# Patient Record
Sex: FEMALE | Race: WHITE | NOT HISPANIC OR LATINO
[De-identification: names, ages, dates, MRNs, and addresses within clinical notes are randomized per-mention and may not be internally consistent; named-entity substitution may affect disease eponyms.]

---

## 2017-10-04 ENCOUNTER — RESULT REVIEW (OUTPATIENT)
Age: 32
End: 2017-10-04

## 2017-11-22 ENCOUNTER — RESULT REVIEW (OUTPATIENT)
Age: 32
End: 2017-11-22

## 2018-01-11 ENCOUNTER — OUTPATIENT (OUTPATIENT)
Dept: OUTPATIENT SERVICES | Facility: HOSPITAL | Age: 33
LOS: 1 days | End: 2018-01-11
Payer: COMMERCIAL

## 2018-01-11 ENCOUNTER — APPOINTMENT (OUTPATIENT)
Dept: RADIOLOGY | Facility: CLINIC | Age: 33
End: 2018-01-11
Payer: COMMERCIAL

## 2018-01-11 DIAGNOSIS — Z00.8 ENCOUNTER FOR OTHER GENERAL EXAMINATION: ICD-10-CM

## 2018-01-11 PROCEDURE — 71046 X-RAY EXAM CHEST 2 VIEWS: CPT | Mod: 26

## 2018-01-11 PROCEDURE — 71046 X-RAY EXAM CHEST 2 VIEWS: CPT

## 2018-01-17 ENCOUNTER — OUTPATIENT (OUTPATIENT)
Dept: OUTPATIENT SERVICES | Facility: HOSPITAL | Age: 33
LOS: 1 days | End: 2018-01-17
Payer: COMMERCIAL

## 2018-01-17 ENCOUNTER — APPOINTMENT (OUTPATIENT)
Dept: ULTRASOUND IMAGING | Facility: CLINIC | Age: 33
End: 2018-01-17
Payer: COMMERCIAL

## 2018-01-17 DIAGNOSIS — Z00.8 ENCOUNTER FOR OTHER GENERAL EXAMINATION: ICD-10-CM

## 2018-01-17 PROCEDURE — 76775 US EXAM ABDO BACK WALL LIM: CPT

## 2018-01-17 PROCEDURE — 76775 US EXAM ABDO BACK WALL LIM: CPT | Mod: 26

## 2018-05-09 ENCOUNTER — TRANSCRIPTION ENCOUNTER (OUTPATIENT)
Age: 33
End: 2018-05-09

## 2018-05-29 ENCOUNTER — APPOINTMENT (OUTPATIENT)
Dept: ORTHOPEDIC SURGERY | Facility: CLINIC | Age: 33
End: 2018-05-29
Payer: COMMERCIAL

## 2018-05-29 VITALS — HEIGHT: 69 IN | BODY MASS INDEX: 29.62 KG/M2 | WEIGHT: 200 LBS

## 2018-05-29 PROCEDURE — 99203 OFFICE O/P NEW LOW 30 MIN: CPT

## 2018-05-29 PROCEDURE — 73030 X-RAY EXAM OF SHOULDER: CPT | Mod: LT

## 2018-10-04 ENCOUNTER — RESULT REVIEW (OUTPATIENT)
Age: 33
End: 2018-10-04

## 2020-04-25 ENCOUNTER — MESSAGE (OUTPATIENT)
Age: 35
End: 2020-04-25

## 2020-05-04 ENCOUNTER — APPOINTMENT (OUTPATIENT)
Dept: DISASTER EMERGENCY | Facility: HOSPITAL | Age: 35
End: 2020-05-04

## 2020-05-05 LAB
SARS-COV-2 IGG SERPL IA-ACNC: <0.1 INDEX
SARS-COV-2 IGG SERPL QL IA: NEGATIVE

## 2020-05-18 ENCOUNTER — APPOINTMENT (OUTPATIENT)
Dept: DISASTER EMERGENCY | Facility: CLINIC | Age: 35
End: 2020-05-18

## 2020-07-06 ENCOUNTER — OUTPATIENT (OUTPATIENT)
Dept: OUTPATIENT SERVICES | Facility: HOSPITAL | Age: 35
LOS: 1 days | End: 2020-07-06

## 2020-07-06 ENCOUNTER — TRANSCRIPTION ENCOUNTER (OUTPATIENT)
Age: 35
End: 2020-07-06

## 2020-07-06 ENCOUNTER — APPOINTMENT (OUTPATIENT)
Dept: PRIMARY CARE | Facility: HOSPITAL | Age: 35
End: 2020-07-06

## 2020-07-06 VITALS
TEMPERATURE: 97.8 F | HEIGHT: 69 IN | BODY MASS INDEX: 24.73 KG/M2 | DIASTOLIC BLOOD PRESSURE: 88 MMHG | HEART RATE: 89 BPM | SYSTOLIC BLOOD PRESSURE: 163 MMHG | WEIGHT: 167 LBS

## 2020-07-06 LAB
BILIRUB UR QL STRIP: NEGATIVE
GLUCOSE UR-MCNC: NEGATIVE
HCG UR QL: 0.2 EU/DL
HGB UR QL STRIP.AUTO: NORMAL
KETONES UR-MCNC: NEGATIVE
LEUKOCYTE ESTERASE UR QL STRIP: NEGATIVE
NITRITE UR QL STRIP: NEGATIVE
PH UR STRIP: 5.5
PROT UR STRIP-MCNC: NEGATIVE
SP GR UR STRIP: 1

## 2020-07-06 NOTE — REVIEW OF SYSTEMS
[Negative] : Heme/Lymph [FreeTextEntry4] : Positive neck pain and neck spasms  [FreeTextEntry9] : Positive for Back pain
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
## 2020-07-06 NOTE — HEALTH RISK ASSESSMENT
[No] : No [No falls in past year] : Patient reported no falls in the past year [0] : 2) Feeling down, depressed, or hopeless: Not at all (0) [de-identified] : regular diet  [de-identified] : normal actiivity

## 2020-07-06 NOTE — PHYSICAL EXAM
[No Acute Distress] : no acute distress [Well Nourished] : well nourished [Well-Appearing] : well-appearing [Well Developed] : well developed [Normal Outer Ear/Nose] : the outer ears and nose were normal in appearance [Normal Sclera/Conjunctiva] : normal sclera/conjunctiva [PERRL] : pupils equal round and reactive to light [EOMI] : extraocular movements intact [No Respiratory Distress] : no respiratory distress  [No Carotid Bruits] : no carotid bruits [Regular Rhythm] : with a regular rhythm [No Accessory Muscle Use] : no accessory muscle use [Normal Rate] : normal rate  [Soft] : abdomen soft [Non Tender] : non-tender [No Abdominal Bruit] : a ~M bruit was not heard ~T in the abdomen [No Rash] : no rash [No Spinal Tenderness] : no spinal tenderness [No CVA Tenderness] : no CVA  tenderness [No Focal Deficits] : no focal deficits [Coordination Grossly Intact] : coordination grossly intact [Normal Gait] : normal gait [de-identified] : Negative Straight Leg Test  [de-identified] : + Neck Pain/ spasm -> tender on Cervical region C4-C5, slight pain upon palpation, no mass or visible injury, able to flex and hyperextend the neck, able to move head 90 degrees to the left but only 45 degrees to the left

## 2020-07-07 DIAGNOSIS — M62.838 OTHER MUSCLE SPASM: ICD-10-CM

## 2020-07-07 DIAGNOSIS — K21.9 GASTRO-ESOPHAGEAL REFLUX DISEASE WITHOUT ESOPHAGITIS: ICD-10-CM

## 2020-07-07 DIAGNOSIS — R35.0 FREQUENCY OF MICTURITION: ICD-10-CM

## 2020-07-07 DIAGNOSIS — I10 ESSENTIAL (PRIMARY) HYPERTENSION: ICD-10-CM

## 2020-10-13 ENCOUNTER — TRANSCRIPTION ENCOUNTER (OUTPATIENT)
Age: 35
End: 2020-10-13

## 2020-10-27 ENCOUNTER — APPOINTMENT (OUTPATIENT)
Dept: ORTHOPEDIC SURGERY | Facility: CLINIC | Age: 35
End: 2020-10-27
Payer: OTHER MISCELLANEOUS

## 2020-10-27 VITALS
HEIGHT: 69 IN | SYSTOLIC BLOOD PRESSURE: 132 MMHG | BODY MASS INDEX: 27.4 KG/M2 | DIASTOLIC BLOOD PRESSURE: 87 MMHG | HEART RATE: 90 BPM | WEIGHT: 185 LBS

## 2020-10-27 DIAGNOSIS — M41.9 SCOLIOSIS, UNSPECIFIED: ICD-10-CM

## 2020-10-27 PROCEDURE — 72100 X-RAY EXAM L-S SPINE 2/3 VWS: CPT

## 2020-10-27 PROCEDURE — 99072 ADDL SUPL MATRL&STAF TM PHE: CPT

## 2020-10-27 PROCEDURE — 99204 OFFICE O/P NEW MOD 45 MIN: CPT

## 2020-11-02 ENCOUNTER — NON-APPOINTMENT (OUTPATIENT)
Age: 35
End: 2020-11-02

## 2020-12-17 ENCOUNTER — FORM ENCOUNTER (OUTPATIENT)
Age: 35
End: 2020-12-17

## 2021-02-24 ENCOUNTER — RESULT REVIEW (OUTPATIENT)
Age: 36
End: 2021-02-24

## 2021-03-02 ENCOUNTER — APPOINTMENT (OUTPATIENT)
Dept: ULTRASOUND IMAGING | Facility: IMAGING CENTER | Age: 36
End: 2021-03-02
Payer: COMMERCIAL

## 2021-03-02 ENCOUNTER — APPOINTMENT (OUTPATIENT)
Dept: MAMMOGRAPHY | Facility: IMAGING CENTER | Age: 36
End: 2021-03-02
Payer: COMMERCIAL

## 2021-03-02 ENCOUNTER — OUTPATIENT (OUTPATIENT)
Dept: OUTPATIENT SERVICES | Facility: HOSPITAL | Age: 36
LOS: 1 days | End: 2021-03-02
Payer: COMMERCIAL

## 2021-03-02 DIAGNOSIS — Z00.8 ENCOUNTER FOR OTHER GENERAL EXAMINATION: ICD-10-CM

## 2021-03-02 PROCEDURE — 77067 SCR MAMMO BI INCL CAD: CPT

## 2021-03-02 PROCEDURE — 76641 ULTRASOUND BREAST COMPLETE: CPT | Mod: 26,50

## 2021-03-02 PROCEDURE — 77063 BREAST TOMOSYNTHESIS BI: CPT | Mod: 26

## 2021-03-02 PROCEDURE — 76641 ULTRASOUND BREAST COMPLETE: CPT

## 2021-03-02 PROCEDURE — 77067 SCR MAMMO BI INCL CAD: CPT | Mod: 26

## 2021-03-02 PROCEDURE — 77063 BREAST TOMOSYNTHESIS BI: CPT

## 2021-03-09 ENCOUNTER — NON-APPOINTMENT (OUTPATIENT)
Age: 36
End: 2021-03-09

## 2021-03-16 ENCOUNTER — NON-APPOINTMENT (OUTPATIENT)
Age: 36
End: 2021-03-16

## 2021-03-16 ENCOUNTER — APPOINTMENT (OUTPATIENT)
Dept: INTERNAL MEDICINE | Facility: CLINIC | Age: 36
End: 2021-03-16
Payer: COMMERCIAL

## 2021-03-16 VITALS — DIASTOLIC BLOOD PRESSURE: 80 MMHG | SYSTOLIC BLOOD PRESSURE: 118 MMHG

## 2021-03-16 VITALS
WEIGHT: 186 LBS | HEART RATE: 79 BPM | TEMPERATURE: 97.7 F | SYSTOLIC BLOOD PRESSURE: 120 MMHG | BODY MASS INDEX: 27.55 KG/M2 | OXYGEN SATURATION: 99 % | HEIGHT: 69 IN | DIASTOLIC BLOOD PRESSURE: 82 MMHG

## 2021-03-16 DIAGNOSIS — Z87.898 PERSONAL HISTORY OF OTHER SPECIFIED CONDITIONS: ICD-10-CM

## 2021-03-16 DIAGNOSIS — Z80.49 FAMILY HISTORY OF MALIGNANT NEOPLASM OF OTHER GENITAL ORGANS: ICD-10-CM

## 2021-03-16 DIAGNOSIS — G47.00 INSOMNIA, UNSPECIFIED: ICD-10-CM

## 2021-03-16 DIAGNOSIS — Z82.0 FAMILY HISTORY OF EPILEPSY AND OTHER DISEASES OF THE NERVOUS SYSTEM: ICD-10-CM

## 2021-03-16 DIAGNOSIS — Z83.438 FAMILY HISTORY OF OTHER DISORDER OF LIPOPROTEIN METABOLISM AND OTHER LIPIDEMIA: ICD-10-CM

## 2021-03-16 DIAGNOSIS — Z86.010 PERSONAL HISTORY OF COLONIC POLYPS: ICD-10-CM

## 2021-03-16 DIAGNOSIS — Z80.41 FAMILY HISTORY OF MALIGNANT NEOPLASM OF OVARY: ICD-10-CM

## 2021-03-16 DIAGNOSIS — M25.512 PAIN IN LEFT SHOULDER: ICD-10-CM

## 2021-03-16 DIAGNOSIS — G51.0 BELL'S PALSY: ICD-10-CM

## 2021-03-16 PROCEDURE — 99072 ADDL SUPL MATRL&STAF TM PHE: CPT

## 2021-03-16 PROCEDURE — 93000 ELECTROCARDIOGRAM COMPLETE: CPT | Mod: 59

## 2021-03-16 PROCEDURE — 99385 PREV VISIT NEW AGE 18-39: CPT | Mod: 25

## 2021-03-16 PROCEDURE — 36415 COLL VENOUS BLD VENIPUNCTURE: CPT

## 2021-03-16 PROCEDURE — G0444 DEPRESSION SCREEN ANNUAL: CPT | Mod: NC,59

## 2021-03-16 RX ORDER — IBUPROFEN 600 MG/1
600 TABLET, FILM COATED ORAL 3 TIMES DAILY
Qty: 90 | Refills: 1 | Status: COMPLETED | COMMUNITY
Start: 2020-07-06 | End: 2021-03-16

## 2021-03-16 RX ORDER — IBUPROFEN 600 MG/1
600 TABLET, FILM COATED ORAL 3 TIMES DAILY
Qty: 90 | Refills: 1 | Status: COMPLETED | COMMUNITY
Start: 2020-10-27 | End: 2021-03-16

## 2021-03-16 RX ORDER — CYCLOBENZAPRINE HYDROCHLORIDE 10 MG/1
10 TABLET, FILM COATED ORAL 3 TIMES DAILY
Qty: 90 | Refills: 1 | Status: COMPLETED | COMMUNITY
Start: 2020-07-06 | End: 2021-03-16

## 2021-03-16 NOTE — PHYSICAL EXAM
[No Acute Distress] : no acute distress [Well Nourished] : well nourished [Well Developed] : well developed [Well-Appearing] : well-appearing [Normal Voice/Communication] : normal voice/communication [Normal Sclera/Conjunctiva] : normal sclera/conjunctiva [PERRL] : pupils equal round and reactive to light [EOMI] : extraocular movements intact [Normal Outer Ear/Nose] : the outer ears and nose were normal in appearance [Normal Oropharynx] : the oropharynx was normal [Normal TMs] : both tympanic membranes were normal [No JVD] : no jugular venous distention [No Lymphadenopathy] : no lymphadenopathy [Supple] : supple [Thyroid Normal, No Nodules] : the thyroid was normal and there were no nodules present [No Respiratory Distress] : no respiratory distress  [No Accessory Muscle Use] : no accessory muscle use [Clear to Auscultation] : lungs were clear to auscultation bilaterally [Normal Rate] : normal rate  [Regular Rhythm] : with a regular rhythm [Normal S1, S2] : normal S1 and S2 [No Murmur] : no murmur heard [No Carotid Bruits] : no carotid bruits [No Abdominal Bruit] : a ~M bruit was not heard ~T in the abdomen [No Varicosities] : no varicosities [Pedal Pulses Present] : the pedal pulses are present [No Edema] : there was no peripheral edema [No Palpable Aorta] : no palpable aorta [No Extremity Clubbing/Cyanosis] : no extremity clubbing/cyanosis [Normal Appearance] : normal in appearance [No Nipple Discharge] : no nipple discharge [No Axillary Lymphadenopathy] : no axillary lymphadenopathy [Soft] : abdomen soft [Non Tender] : non-tender [Non-distended] : non-distended [No Masses] : no abdominal mass palpated [No HSM] : no HSM [Normal Bowel Sounds] : normal bowel sounds [Normal Posterior Cervical Nodes] : no posterior cervical lymphadenopathy [Normal Anterior Cervical Nodes] : no anterior cervical lymphadenopathy [No CVA Tenderness] : no CVA  tenderness [No Spinal Tenderness] : no spinal tenderness [No Joint Swelling] : no joint swelling [Grossly Normal Strength/Tone] : grossly normal strength/tone [No Rash] : no rash [Coordination Grossly Intact] : coordination grossly intact [No Focal Deficits] : no focal deficits [Normal Gait] : normal gait [Deep Tendon Reflexes (DTR)] : deep tendon reflexes were 2+ and symmetric [Normal Affect] : the affect was normal [Normal Insight/Judgement] : insight and judgment were intact [Scoliosis] : scoliosis

## 2021-03-16 NOTE — HEALTH RISK ASSESSMENT
[Excellent] : ~his/her~  mood as  excellent [Patient reported PAP Smear was normal] : Patient reported PAP Smear was normal [Patient reported mammogram was abnormal] : Patient reported mammogram was abnormal [No] : No [One fall no injury in past year] : Patient reported one fall in the past year without injury [0] : 2) Feeling down, depressed, or hopeless: Not at all (0) [HIV Test offered] : HIV Test offered [Hepatitis C test offered] : Hepatitis C test offered [Alone] : lives alone [On disability] : on disability [Single] : single [Feels Safe at Home] : Feels safe at home [Fully functional (bathing, dressing, toileting, transferring, walking, feeding)] : Fully functional (bathing, dressing, toileting, transferring, walking, feeding) [Fully functional (using the telephone, shopping, preparing meals, housekeeping, doing laundry, using] : Fully functional and needs no help or supervision to perform IADLs (using the telephone, shopping, preparing meals, housekeeping, doing laundry, using transportation, managing medications and managing finances) [Smoke Detector] : smoke detector [Carbon Monoxide Detector] : carbon monoxide detector [Seat Belt] :  uses seat belt [Sunscreen] : uses sunscreen [With Patient/Caregiver] : With Patient/Caregiver [Name: ___] : Health Care Proxy's Name: [unfilled]  [Relationship: ___] : Relationship: [unfilled] [de-identified] : Limited by back pain / herniated disc. [de-identified] : One Mechanical fall - not sure if it was a year ago or more. [Change in mental status noted] : No change in mental status noted [Language] : denies difficulty with language [Behavior] : denies difficulty with behavior [Handling Complex Tasks] : denies difficulty handling complex tasks [Reasoning] : denies difficulty with reasoning [Sexually Active] : not sexually active [Reports changes in hearing] : Reports no changes in hearing [Reports changes in vision] : Reports no changes in vision [Reports changes in dental health] : Reports no changes in dental health [MammogramDate] : 3/21 [PapSmearDate] : 2/21 [PapSmearComments] : Dr. Carlos Camara [de-identified] : January 2021. [de-identified] : Seen January 2021. [AdvancecareDate] : 03/16/2021

## 2021-03-16 NOTE — HISTORY OF PRESENT ILLNESS
[de-identified] : New Patient - 1st visit to [facility] [place].\par Recent PCP - Kodak Nguyen.\par \par 35 year old female with a history of PCOS, GERD, and herniated disc.  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  She had herniation of L4-L5 with nerve compression.  She went on medical leave and is on workers compensation.  She sees Dr. Justin Hooks (Neuro).  She had an epidural which did not help her symptoms.  She had been on ibuprofen, but couldn't tolerate it due to her GERD.  She is currently on prn cyclobenzaprine, Tylenol, gabapentin.\par \[facility] Patient will be seeing Dr. Bert Carballo this week for ongoing management of GERD and her history of colon polyps.  She had EGD in 2017 showing gastritis. She had a colonoscopy at that time, and there was polyp removed.   She previously saw Dr. Eulalio Kirby.  Five year follow-up was recommended.\par \par Patient had a normal thyroid US about 2014.  Her PCP at the time felt that the thyroid was enlarged.  She went to an Endocrinologist at the time as well and reports that there were no significant findings.  Recently, her Gyn felt it was enlarged as well.  Patient does not have symptoms of an overactive or underactive thyroid.\par \[facility] Patient had her mammogram and breast US 3/2/2021.  She had the COVID-19 vaccine about 2 hours before, but there were swollen LNs which will be followed in 3 months.  There was a lobulated circumscribed mass in the lower outer R breast on the mammogram corresponding to a cluster of microcysts on the US at 8:00.  Six month follow-up targeted US was recommended for this finding.  BIRADS-3.

## 2021-03-22 ENCOUNTER — OUTPATIENT (OUTPATIENT)
Dept: OUTPATIENT SERVICES | Facility: HOSPITAL | Age: 36
LOS: 1 days | End: 2021-03-22
Payer: COMMERCIAL

## 2021-03-22 ENCOUNTER — APPOINTMENT (OUTPATIENT)
Dept: ULTRASOUND IMAGING | Facility: CLINIC | Age: 36
End: 2021-03-22
Payer: COMMERCIAL

## 2021-03-22 DIAGNOSIS — Z00.00 ENCOUNTER FOR GENERAL ADULT MEDICAL EXAMINATION WITHOUT ABNORMAL FINDINGS: ICD-10-CM

## 2021-03-22 PROCEDURE — 76700 US EXAM ABDOM COMPLETE: CPT | Mod: 26

## 2021-03-22 PROCEDURE — 76700 US EXAM ABDOM COMPLETE: CPT

## 2021-05-26 ENCOUNTER — RESULT REVIEW (OUTPATIENT)
Age: 36
End: 2021-05-26

## 2021-06-04 ENCOUNTER — APPOINTMENT (OUTPATIENT)
Dept: ULTRASOUND IMAGING | Facility: IMAGING CENTER | Age: 36
End: 2021-06-04
Payer: COMMERCIAL

## 2021-06-04 ENCOUNTER — OUTPATIENT (OUTPATIENT)
Dept: OUTPATIENT SERVICES | Facility: HOSPITAL | Age: 36
LOS: 1 days | End: 2021-06-04
Payer: COMMERCIAL

## 2021-06-04 DIAGNOSIS — Z00.8 ENCOUNTER FOR OTHER GENERAL EXAMINATION: ICD-10-CM

## 2021-06-04 DIAGNOSIS — R59.0 LOCALIZED ENLARGED LYMPH NODES: ICD-10-CM

## 2021-06-04 PROCEDURE — 76882 US LMTD JT/FCL EVL NVASC XTR: CPT

## 2021-06-04 PROCEDURE — 76882 US LMTD JT/FCL EVL NVASC XTR: CPT | Mod: 26,RT

## 2021-06-30 ENCOUNTER — RESULT REVIEW (OUTPATIENT)
Age: 36
End: 2021-06-30

## 2021-07-10 LAB
25(OH)D3 SERPL-MCNC: 27.6 NG/ML
ALBUMIN SERPL ELPH-MCNC: 4.9 G/DL
ALP BLD-CCNC: 57 U/L
ALT SERPL-CCNC: 9 U/L
ANION GAP SERPL CALC-SCNC: 12 MMOL/L
APPEARANCE: CLEAR
AST SERPL-CCNC: 16 U/L
BACTERIA: NEGATIVE
BASOPHILS # BLD AUTO: 0.08 K/UL
BASOPHILS NFR BLD AUTO: 0.8 %
BILIRUB SERPL-MCNC: 0.6 MG/DL
BILIRUBIN URINE: NEGATIVE
BLOOD URINE: NORMAL
BUN SERPL-MCNC: 5 MG/DL
CALCIUM SERPL-MCNC: 9.8 MG/DL
CHLORIDE SERPL-SCNC: 103 MMOL/L
CHOLEST SERPL-MCNC: 176 MG/DL
CO2 SERPL-SCNC: 26 MMOL/L
COLOR: COLORLESS
CREAT SERPL-MCNC: 0.7 MG/DL
EOSINOPHIL # BLD AUTO: 0 K/UL
EOSINOPHIL NFR BLD AUTO: 0 %
ESTIMATED AVERAGE GLUCOSE: 97 MG/DL
GLUCOSE QUALITATIVE U: NEGATIVE
GLUCOSE SERPL-MCNC: 91 MG/DL
HBA1C MFR BLD HPLC: 5 %
HCT VFR BLD CALC: 41.8 %
HCV AB SER QL: NONREACTIVE
HCV S/CO RATIO: 0.11 S/CO
HDLC SERPL-MCNC: 62 MG/DL
HGB BLD-MCNC: 13.4 G/DL
HIV1+2 AB SPEC QL IA.RAPID: NONREACTIVE
HYALINE CASTS: 1 /LPF
IMM GRANULOCYTES NFR BLD AUTO: 0.3 %
KETONES URINE: NEGATIVE
LDLC SERPL CALC-MCNC: 95 MG/DL
LEUKOCYTE ESTERASE URINE: NEGATIVE
LYMPHOCYTES # BLD AUTO: 2.51 K/UL
LYMPHOCYTES NFR BLD AUTO: 24.6 %
MAN DIFF?: NORMAL
MCHC RBC-ENTMCNC: 31.9 PG
MCHC RBC-ENTMCNC: 32.1 GM/DL
MCV RBC AUTO: 99.5 FL
MICROSCOPIC-UA: NORMAL
MONOCYTES # BLD AUTO: 0.76 K/UL
MONOCYTES NFR BLD AUTO: 7.5 %
NEUTROPHILS # BLD AUTO: 6.81 K/UL
NEUTROPHILS NFR BLD AUTO: 66.8 %
NITRITE URINE: NEGATIVE
NONHDLC SERPL-MCNC: 114 MG/DL
PH URINE: 6.5
PLATELET # BLD AUTO: 285 K/UL
POTASSIUM SERPL-SCNC: 4.1 MMOL/L
PROT SERPL-MCNC: 7.7 G/DL
PROTEIN URINE: NEGATIVE
RBC # BLD: 4.2 M/UL
RBC # FLD: 12.6 %
RED BLOOD CELLS URINE: 4 /HPF
SODIUM SERPL-SCNC: 141 MMOL/L
SPECIFIC GRAVITY URINE: 1
SQUAMOUS EPITHELIAL CELLS: 1 /HPF
T4 FREE SERPL-MCNC: 1 NG/DL
TRIGL SERPL-MCNC: 97 MG/DL
TSH SERPL-ACNC: 0.97 UIU/ML
UROBILINOGEN URINE: NORMAL
WBC # FLD AUTO: 10.19 K/UL
WHITE BLOOD CELLS URINE: 0 /HPF

## 2021-09-10 ENCOUNTER — OUTPATIENT (OUTPATIENT)
Dept: OUTPATIENT SERVICES | Facility: HOSPITAL | Age: 36
LOS: 1 days | End: 2021-09-10
Payer: COMMERCIAL

## 2021-09-10 ENCOUNTER — APPOINTMENT (OUTPATIENT)
Dept: ULTRASOUND IMAGING | Facility: IMAGING CENTER | Age: 36
End: 2021-09-10
Payer: COMMERCIAL

## 2021-09-10 DIAGNOSIS — Z00.8 ENCOUNTER FOR OTHER GENERAL EXAMINATION: ICD-10-CM

## 2021-09-10 PROCEDURE — 76642 ULTRASOUND BREAST LIMITED: CPT | Mod: 26,RT

## 2021-09-10 PROCEDURE — 76642 ULTRASOUND BREAST LIMITED: CPT

## 2021-09-17 ENCOUNTER — APPOINTMENT (OUTPATIENT)
Dept: INTERNAL MEDICINE | Facility: CLINIC | Age: 36
End: 2021-09-17
Payer: COMMERCIAL

## 2021-09-17 VITALS
WEIGHT: 194 LBS | SYSTOLIC BLOOD PRESSURE: 140 MMHG | HEIGHT: 69 IN | BODY MASS INDEX: 28.73 KG/M2 | DIASTOLIC BLOOD PRESSURE: 90 MMHG | TEMPERATURE: 98 F | HEART RATE: 88 BPM | OXYGEN SATURATION: 99 %

## 2021-09-17 VITALS — SYSTOLIC BLOOD PRESSURE: 138 MMHG | DIASTOLIC BLOOD PRESSURE: 92 MMHG

## 2021-09-17 PROCEDURE — 90686 IIV4 VACC NO PRSV 0.5 ML IM: CPT

## 2021-09-17 PROCEDURE — G0008: CPT

## 2021-09-17 PROCEDURE — 36415 COLL VENOUS BLD VENIPUNCTURE: CPT

## 2021-09-17 PROCEDURE — 99214 OFFICE O/P EST MOD 30 MIN: CPT | Mod: 25

## 2021-09-17 RX ORDER — PANTOPRAZOLE 40 MG/1
40 TABLET, DELAYED RELEASE ORAL DAILY
Qty: 30 | Refills: 1 | Status: COMPLETED | COMMUNITY
Start: 2020-07-06 | End: 2021-09-17

## 2021-09-17 RX ORDER — FAMOTIDINE 40 MG/1
40 TABLET, FILM COATED ORAL DAILY
Qty: 90 | Refills: 0 | Status: COMPLETED | COMMUNITY
End: 2021-09-17

## 2021-09-17 NOTE — HISTORY OF PRESENT ILLNESS
[de-identified] : Patient presents for follow-up.  Patient says she had a 2 facet injection from a pain management physician.  She continues to see Dr. Hooks (Neuro), and a nerve ablation is planned.  She continues to get physical therapy, and is on tramadol 50mg, 1-2 tabs every 8 hours as needed, gabapentin 900mg three times daily, and cyclobenzaprine 5mg prn.  Patient requests a BMP the check her kidney function due to the dye used in the epidurals.\par \par Patient remains on olmesartan 40mg daily for her hypertension.  \par \par For her GERD, patient is no longer on pantoprazole.  She manages it with diet control and avoids NSAIDs.\par \par Patient had a follow-up R breast US 9/10/2021.  There were 2 benign appearing cyst clusters.  Annual screening mammography beginning at age 40 was recommended.\par \par Patient reports that she received the 2nd Pfizer COVID-19 vaccine 3/23/2021.\par \par Patient says that her salivary glands (submandibular) swell up after ibuprofen, and sometimes after vomiting.  She is wondering if they are lymph nodes.

## 2021-09-17 NOTE — PHYSICAL EXAM
[No Lymphadenopathy] : no lymphadenopathy [Thyroid Normal, No Nodules] : the thyroid was normal and there were no nodules present [No Edema] : there was no peripheral edema [Normal] : soft, non-tender, non-distended, no masses palpated, no HSM and normal bowel sounds

## 2021-09-17 NOTE — ASSESSMENT
[FreeTextEntry1] : (1) CV - patient's BP remains elevated.  The olmesartan 40mg is at it's maximum.  Will add amlodipine 2.5mg daily.  Patient advised to contact me for GI upset, leg swelling or rash from the amlodipine.  She can check her BP at home, and follow-up with me in 2 months.\par \par (2) Neck - unclear if patient was having enlarged LN's or salivary glands.  She reports a prior neck US years ago through an Endocrinologist, and was told that her thyroid was fine.  Will check a repeat neck US at this time to clarify if patient has any enlarged LN's, thyroid, or salivary glands.\par \par (3) Back pain - continue regimen as per Dr. Hooks (Neuro).\par \par (4) Repeat breast US reviewed - patient with benign cyst clusters.\par \par (5) GI - diet control for GERD, and avoid NSAIDs.\par \par (6) Patient given the influenza vaccination today.\par \par Labs drawn in office as below.

## 2021-09-22 ENCOUNTER — APPOINTMENT (OUTPATIENT)
Dept: ULTRASOUND IMAGING | Facility: CLINIC | Age: 36
End: 2021-09-22
Payer: COMMERCIAL

## 2021-09-22 ENCOUNTER — OUTPATIENT (OUTPATIENT)
Dept: OUTPATIENT SERVICES | Facility: HOSPITAL | Age: 36
LOS: 1 days | End: 2021-09-22
Payer: COMMERCIAL

## 2021-09-22 DIAGNOSIS — Z00.8 ENCOUNTER FOR OTHER GENERAL EXAMINATION: ICD-10-CM

## 2021-09-22 DIAGNOSIS — R59.0 LOCALIZED ENLARGED LYMPH NODES: ICD-10-CM

## 2021-09-22 PROCEDURE — 76536 US EXAM OF HEAD AND NECK: CPT

## 2021-09-22 PROCEDURE — 76536 US EXAM OF HEAD AND NECK: CPT | Mod: 26

## 2021-10-14 LAB
ALBUMIN SERPL ELPH-MCNC: 4.7 G/DL
ALP BLD-CCNC: 66 U/L
ALT SERPL-CCNC: 22 U/L
ANION GAP SERPL CALC-SCNC: 12 MMOL/L
AST SERPL-CCNC: 20 U/L
BASOPHILS # BLD AUTO: 0.08 K/UL
BASOPHILS NFR BLD AUTO: 0.8 %
BILIRUB SERPL-MCNC: 0.4 MG/DL
BUN SERPL-MCNC: 12 MG/DL
CALCIUM SERPL-MCNC: 9.7 MG/DL
CHLORIDE SERPL-SCNC: 101 MMOL/L
CO2 SERPL-SCNC: 24 MMOL/L
CREAT SERPL-MCNC: 0.63 MG/DL
EOSINOPHIL # BLD AUTO: 0 K/UL
EOSINOPHIL NFR BLD AUTO: 0 %
GLUCOSE SERPL-MCNC: 92 MG/DL
HCT VFR BLD CALC: 37.2 %
HGB BLD-MCNC: 12.4 G/DL
IMM GRANULOCYTES NFR BLD AUTO: 0.4 %
LYMPHOCYTES # BLD AUTO: 1.64 K/UL
LYMPHOCYTES NFR BLD AUTO: 17 %
MAN DIFF?: NORMAL
MCHC RBC-ENTMCNC: 31.4 PG
MCHC RBC-ENTMCNC: 33.3 GM/DL
MCV RBC AUTO: 94.2 FL
MONOCYTES # BLD AUTO: 0.89 K/UL
MONOCYTES NFR BLD AUTO: 9.2 %
NEUTROPHILS # BLD AUTO: 6.99 K/UL
NEUTROPHILS NFR BLD AUTO: 72.6 %
PLATELET # BLD AUTO: 299 K/UL
POTASSIUM SERPL-SCNC: 4.3 MMOL/L
PROT SERPL-MCNC: 7.1 G/DL
RBC # BLD: 3.95 M/UL
RBC # FLD: 12.1 %
SODIUM SERPL-SCNC: 138 MMOL/L
WBC # FLD AUTO: 9.64 K/UL

## 2021-11-06 ENCOUNTER — TRANSCRIPTION ENCOUNTER (OUTPATIENT)
Age: 36
End: 2021-11-06

## 2021-11-15 ENCOUNTER — APPOINTMENT (OUTPATIENT)
Dept: INTERNAL MEDICINE | Facility: CLINIC | Age: 36
End: 2021-11-15
Payer: COMMERCIAL

## 2021-11-15 PROCEDURE — 99441: CPT

## 2021-12-09 ENCOUNTER — RX RENEWAL (OUTPATIENT)
Age: 36
End: 2021-12-09

## 2022-01-31 ENCOUNTER — RX RENEWAL (OUTPATIENT)
Age: 37
End: 2022-01-31

## 2022-02-18 ENCOUNTER — APPOINTMENT (OUTPATIENT)
Dept: INTERNAL MEDICINE | Facility: CLINIC | Age: 37
End: 2022-02-18
Payer: COMMERCIAL

## 2022-02-18 VITALS — HEART RATE: 80 BPM | SYSTOLIC BLOOD PRESSURE: 128 MMHG | DIASTOLIC BLOOD PRESSURE: 80 MMHG

## 2022-02-18 VITALS
HEART RATE: 103 BPM | HEIGHT: 69 IN | DIASTOLIC BLOOD PRESSURE: 90 MMHG | SYSTOLIC BLOOD PRESSURE: 140 MMHG | TEMPERATURE: 97.9 F | OXYGEN SATURATION: 99 % | BODY MASS INDEX: 29.77 KG/M2 | WEIGHT: 201 LBS

## 2022-02-18 VITALS — SYSTOLIC BLOOD PRESSURE: 132 MMHG | HEART RATE: 84 BPM | DIASTOLIC BLOOD PRESSURE: 90 MMHG

## 2022-02-18 DIAGNOSIS — K29.70 GASTRITIS, UNSPECIFIED, W/OUT BLEEDING: ICD-10-CM

## 2022-02-18 DIAGNOSIS — R59.9 ENLARGED LYMPH NODES, UNSPECIFIED: ICD-10-CM

## 2022-02-18 DIAGNOSIS — R59.0 LOCALIZED ENLARGED LYMPH NODES: ICD-10-CM

## 2022-02-18 PROCEDURE — 99214 OFFICE O/P EST MOD 30 MIN: CPT | Mod: 25

## 2022-02-18 PROCEDURE — 36415 COLL VENOUS BLD VENIPUNCTURE: CPT

## 2022-02-19 ENCOUNTER — NON-APPOINTMENT (OUTPATIENT)
Age: 37
End: 2022-02-19

## 2022-02-21 ENCOUNTER — LABORATORY RESULT (OUTPATIENT)
Age: 37
End: 2022-02-21

## 2022-02-22 LAB
ALBUMIN SERPL ELPH-MCNC: 4.6 G/DL
ALP BLD-CCNC: 71 U/L
ALT SERPL-CCNC: 12 U/L
AMYLASE/CREAT SERPL: 119 U/L
ANION GAP SERPL CALC-SCNC: 12 MMOL/L
AST SERPL-CCNC: 17 U/L
BASOPHILS # BLD AUTO: 0.08 K/UL
BASOPHILS NFR BLD AUTO: 1.1 %
BILIRUB SERPL-MCNC: 0.3 MG/DL
BUN SERPL-MCNC: 6 MG/DL
CALCIUM SERPL-MCNC: 9.8 MG/DL
CHLORIDE SERPL-SCNC: 102 MMOL/L
CO2 SERPL-SCNC: 26 MMOL/L
CREAT SERPL-MCNC: 0.55 MG/DL
EOSINOPHIL # BLD AUTO: 0.01 K/UL
EOSINOPHIL NFR BLD AUTO: 0.1 %
GLUCOSE SERPL-MCNC: 78 MG/DL
HCT VFR BLD CALC: 37.7 %
HGB BLD-MCNC: 12.3 G/DL
IMM GRANULOCYTES NFR BLD AUTO: 0.1 %
LPL SERPL-CCNC: 17 U/L
LYMPHOCYTES # BLD AUTO: 2.07 K/UL
LYMPHOCYTES NFR BLD AUTO: 28 %
MAN DIFF?: NORMAL
MCHC RBC-ENTMCNC: 30.1 PG
MCHC RBC-ENTMCNC: 32.6 GM/DL
MCV RBC AUTO: 92.4 FL
MONOCYTES # BLD AUTO: 0.64 K/UL
MONOCYTES NFR BLD AUTO: 8.7 %
NEUTROPHILS # BLD AUTO: 4.58 K/UL
NEUTROPHILS NFR BLD AUTO: 62 %
PLATELET # BLD AUTO: 333 K/UL
POTASSIUM SERPL-SCNC: 4.5 MMOL/L
PROT SERPL-MCNC: 7.2 G/DL
RBC # BLD: 4.08 M/UL
RBC # FLD: 12.3 %
SODIUM SERPL-SCNC: 140 MMOL/L
WBC # FLD AUTO: 7.39 K/UL

## 2022-03-03 PROBLEM — R59.0 ENLARGED LYMPH NODE IN NECK: Status: ACTIVE | Noted: 2021-09-17

## 2022-03-03 PROBLEM — K29.70 GASTRITIS: Status: ACTIVE | Noted: 2022-02-18

## 2022-03-03 NOTE — PHYSICAL EXAM
[Normal Voice/Communication] : normal voice/communication [Normal Sclera/Conjunctiva] : normal sclera/conjunctiva [Normal Outer Ear/Nose] : the outer ears and nose were normal in appearance [Normal Oropharynx] : the oropharynx was normal [Normal TMs] : both tympanic membranes were normal [Normal Nasal Mucosa] : the nasal mucosa was normal [No Edema] : there was no peripheral edema [Normal] : soft, non-tender, non-distended, no masses palpated, no HSM and normal bowel sounds [de-identified] : Mild anterior and lateral cervical lymphadenopathy.

## 2022-03-03 NOTE — ASSESSMENT
[FreeTextEntry1] : (1) GI - patient had an emesis, and concerned about return of gastritis.  Labs drawn in office as below.  Will give a course of a PPI - patient can use for 14 days, and then discontinue if improved.  She can then see GI if needed.  She should avoid NSAIDs and other gastric irritants in her diet.\par \par (2) Enlarged lymph nodes - likely a viral syndrome.  Will send patient for an RVP (including COVID-19).  She can use Tyelnol as needed.  Will not image at this point, but patient can contact me if the nodes persist.

## 2022-03-03 NOTE — HISTORY OF PRESENT ILLNESS
[FreeTextEntry8] : Patient started vomiting in the past weeks (with no blood).  She then developed swelling of the glands on her neck bilaterally.  The swelling comes and goes.  When she has the swelling, the nodes are tender.  It goes up towards her ear.  She has been putting heat on it.  She has some abdominal pains with eating.  She had diarrhea once yesterday.  No fevers, chills, jaundice.  No other swollen glands.  No sick contacts or travel.  Patient has not been working since October 2020.\par \par Patient received a 3rd dose of the Pfizer COVID-19 vaccine on 12/22/2021.  Patient never had a COVID-19 infection to her knowledge since the beginning of the pandemic.\par \par Patient not on a PPI currently, although she feels she may have a recurrence of her gastritis.

## 2022-03-22 ENCOUNTER — NON-APPOINTMENT (OUTPATIENT)
Age: 37
End: 2022-03-22

## 2022-03-22 ENCOUNTER — APPOINTMENT (OUTPATIENT)
Dept: INTERNAL MEDICINE | Facility: CLINIC | Age: 37
End: 2022-03-22
Payer: COMMERCIAL

## 2022-03-22 VITALS
WEIGHT: 197 LBS | HEIGHT: 69 IN | DIASTOLIC BLOOD PRESSURE: 90 MMHG | OXYGEN SATURATION: 98 % | TEMPERATURE: 97.8 F | BODY MASS INDEX: 29.18 KG/M2 | HEART RATE: 82 BPM | SYSTOLIC BLOOD PRESSURE: 130 MMHG

## 2022-03-22 VITALS — SYSTOLIC BLOOD PRESSURE: 122 MMHG | DIASTOLIC BLOOD PRESSURE: 82 MMHG

## 2022-03-22 PROCEDURE — 99395 PREV VISIT EST AGE 18-39: CPT | Mod: 25

## 2022-03-22 PROCEDURE — 93000 ELECTROCARDIOGRAM COMPLETE: CPT | Mod: 59

## 2022-03-22 PROCEDURE — G0444 DEPRESSION SCREEN ANNUAL: CPT | Mod: 59

## 2022-03-22 PROCEDURE — 36415 COLL VENOUS BLD VENIPUNCTURE: CPT

## 2022-03-22 RX ORDER — GABAPENTIN 300 MG/1
300 CAPSULE ORAL 3 TIMES DAILY
Refills: 0 | Status: COMPLETED | COMMUNITY
End: 2022-03-22

## 2022-03-22 RX ORDER — AMLODIPINE BESYLATE 2.5 MG/1
2.5 TABLET ORAL
Qty: 90 | Refills: 0 | Status: COMPLETED | COMMUNITY
Start: 2021-09-17 | End: 2022-03-22

## 2022-03-22 NOTE — HISTORY OF PRESENT ILLNESS
[de-identified] : Patient presents for a follow-up annual physical.\par \par Patient had a nerve ablation 11/2/2021 in the lumbar spine with Dr. Lomeli.  Patient remains on worker's comp since she hurt her back moving a patient.  Patient notes that she is off gabapentin, and now on Lyrica 100mg PO tid.  Her amlodipine is on hold.\par \par 35 year old female with a history of PCOS, GERD, and herniated disc.  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  She had herniation of L4-L5 with nerve compression.  She went on medical leave and is on workers compensation.  She sees Dr. Justin Hooks (Neuro).  She had an epidural which did not help her symptoms.  She had been on ibuprofen, but couldn't tolerate it due to her GERD.  She is currently on prn cyclobenzaprine, Tylenol, Lyrica.\par \par Patient had a colonoscopy with Bert Carballo 7/1/2021 showing 1 polyp and hemorrhoids.\par \par Patient had a normal thyroid US about 2014.  Her PCP at the time felt that the thyroid was enlarged.  She went to an Endocrinologist at the time as well and reports that there were no significant findings.  Recently, her Gyn felt it was enlarged as well.  Patient does not have symptoms of an overactive or underactive thyroid.\par \par Patient had follow-up of microcysts followed by breast US.  She says she was advised by her Gyn to start regular breast cancer screening at age 40.

## 2022-03-22 NOTE — HEALTH RISK ASSESSMENT
[Excellent] : ~his/her~  mood as  excellent [No] : In the past 12 months have you used drugs other than those required for medical reasons? No [0] : 2) Feeling down, depressed, or hopeless: Not at all (0) [Patient reported mammogram was abnormal] : Patient reported mammogram was abnormal [Patient reported PAP Smear was normal] : Patient reported PAP Smear was normal [HIV Test offered] : HIV Test offered [Alone] : lives alone [On disability] : on disability [Single] : single [Feels Safe at Home] : Feels safe at home [Fully functional (bathing, dressing, toileting, transferring, walking, feeding)] : Fully functional (bathing, dressing, toileting, transferring, walking, feeding) [Fully functional (using the telephone, shopping, preparing meals, housekeeping, doing laundry, using] : Fully functional and needs no help or supervision to perform IADLs (using the telephone, shopping, preparing meals, housekeeping, doing laundry, using transportation, managing medications and managing finances) [Smoke Detector] : smoke detector [Carbon Monoxide Detector] : carbon monoxide detector [Seat Belt] :  uses seat belt [Sunscreen] : uses sunscreen [Never] : Never [No falls in past year] : Patient reported no falls in the past year [Patient reported colonoscopy was abnormal] : Patient reported colonoscopy was abnormal [With Patient/Caregiver] : , with patient/caregiver [PHQ-2 Negative - No further assessment needed] : PHQ-2 Negative - No further assessment needed [de-identified] : Limited by back pain / herniated disc..   Some treadmill - 15 to 40min. [[id number]] : 0 [Change in mental status noted] : No change in mental status noted [Language] : denies difficulty with language [Behavior] : denies difficulty with behavior [Handling Complex Tasks] : denies difficulty handling complex tasks [Reasoning] : denies difficulty with reasoning [Sexually Active] : not sexually active [Reports changes in hearing] : Reports no changes in hearing [Reports changes in vision] : Reports no changes in vision [Reports changes in dental health] : Reports no changes in dental health [MammogramDate] : 3/21 [MammogramComments] : Patient was advised to start regular screening age 38. [PapSmearDate] : 2/21 [PapSmearComments] : Dr. Carlos Camara [ColonoscopyDate] : 07/21 [ColonoscopyComments] : Dr. Carballo, 1 hyperplastic polyp.  3 year follow-up. 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 [de-identified] : January 2022.  Dr. Kain Ford. [de-identified] : Going regularly. [AdvancecareDate] : 03/22/2022 [FreeTextEntry4] : Health Care Proxy on chart.

## 2022-03-22 NOTE — ASSESSMENT
[FreeTextEntry1] : (1) HCM - discussed diet, exercise, weight maintenance.  Labs ordered in office as below, including HIV screening.  Patient received 3 doses of the Pfizer COVID-19 vaccine.  She received the influenza vaccination this past season, and a Tdap approximately 2013.  She has had standard childhood vaccines and also the  Hepatitis B series.  She will have records forwarded from her prior PCP.  Patient had a PAP smear Feb 2021, and will follow-up with Gyn as directed.  She had a baseline breast imaging and will start regular screening at age 40.  Screening colonoscopy 7/2021 with Dr. Carballo showed 1 hyperplastic polyp, and she will have 3 year follow-up.  She will have prior endoscopies forwarded to me.  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  She sees Dr. Justin Hooks who is prescribing Lyrica and cyclobenzaprine.  Patient avoiding NSAIDs due to GERD.\par \par (3) CV - hypertension adequately controlled on Benicar.\par \par (4) GI - patient on pantoprazole, and will continue follow-up with Dr. Carballo.

## 2022-04-23 ENCOUNTER — TRANSCRIPTION ENCOUNTER (OUTPATIENT)
Age: 37
End: 2022-04-23

## 2022-06-17 NOTE — PHYSICAL EXAM
no [No Acute Distress] : no acute distress [Well Nourished] : well nourished [Well Developed] : well developed [Well-Appearing] : well-appearing [Normal Sclera/Conjunctiva] : normal sclera/conjunctiva [PERRL] : pupils equal round and reactive to light [EOMI] : extraocular movements intact [Normal Outer Ear/Nose] : the outer ears and nose were normal in appearance [Normal Oropharynx] : the oropharynx was normal [No JVD] : no jugular venous distention [No Lymphadenopathy] : no lymphadenopathy [Supple] : supple [Thyroid Normal, No Nodules] : the thyroid was normal and there were no nodules present [No Respiratory Distress] : no respiratory distress  [No Accessory Muscle Use] : no accessory muscle use [Clear to Auscultation] : lungs were clear to auscultation bilaterally [Normal Rate] : normal rate  [Regular Rhythm] : with a regular rhythm [Normal S1, S2] : normal S1 and S2 [No Murmur] : no murmur heard [No Carotid Bruits] : no carotid bruits [No Abdominal Bruit] : a ~M bruit was not heard ~T in the abdomen [No Varicosities] : no varicosities [Pedal Pulses Present] : the pedal pulses are present [No Edema] : there was no peripheral edema [No Palpable Aorta] : no palpable aorta [No Extremity Clubbing/Cyanosis] : no extremity clubbing/cyanosis [Soft] : abdomen soft [Non Tender] : non-tender [Non-distended] : non-distended [No HSM] : no HSM [Normal Bowel Sounds] : normal bowel sounds [Normal Posterior Cervical Nodes] : no posterior cervical lymphadenopathy [Normal Anterior Cervical Nodes] : no anterior cervical lymphadenopathy [No CVA Tenderness] : no CVA  tenderness [No Spinal Tenderness] : no spinal tenderness [No Joint Swelling] : no joint swelling [Grossly Normal Strength/Tone] : grossly normal strength/tone [No Rash] : no rash [Coordination Grossly Intact] : coordination grossly intact [No Focal Deficits] : no focal deficits [Normal Gait] : normal gait [Deep Tendon Reflexes (DTR)] : deep tendon reflexes were 2+ and symmetric [Normal Affect] : the affect was normal [Normal Insight/Judgement] : insight and judgment were intact [Normal Voice/Communication] : normal voice/communication [Normal TMs] : both tympanic membranes were normal [Normal Appearance] : normal in appearance [No Masses] : no palpable masses [No Nipple Discharge] : no nipple discharge [No Axillary Lymphadenopathy] : no axillary lymphadenopathy [No Hernias] : no hernias

## 2022-07-04 ENCOUNTER — NON-APPOINTMENT (OUTPATIENT)
Age: 37
End: 2022-07-04

## 2022-07-07 ENCOUNTER — APPOINTMENT (OUTPATIENT)
Dept: ORTHOPEDIC SURGERY | Facility: CLINIC | Age: 37
End: 2022-07-07

## 2022-07-07 VITALS
HEART RATE: 88 BPM | HEIGHT: 69 IN | WEIGHT: 197 LBS | DIASTOLIC BLOOD PRESSURE: 95 MMHG | BODY MASS INDEX: 29.18 KG/M2 | SYSTOLIC BLOOD PRESSURE: 136 MMHG

## 2022-07-07 DIAGNOSIS — M43.16 SPONDYLOLISTHESIS, LUMBAR REGION: ICD-10-CM

## 2022-07-07 DIAGNOSIS — S73.192A OTHER SPRAIN OF LEFT HIP, INITIAL ENCOUNTER: ICD-10-CM

## 2022-07-07 DIAGNOSIS — S73.191A OTHER SPRAIN OF RIGHT HIP, INITIAL ENCOUNTER: ICD-10-CM

## 2022-07-07 PROCEDURE — 99214 OFFICE O/P EST MOD 30 MIN: CPT

## 2022-07-07 PROCEDURE — 73521 X-RAY EXAM HIPS BI 2 VIEWS: CPT

## 2022-07-07 PROCEDURE — 99072 ADDL SUPL MATRL&STAF TM PHE: CPT

## 2022-08-10 ENCOUNTER — APPOINTMENT (OUTPATIENT)
Dept: INTERNAL MEDICINE | Facility: CLINIC | Age: 37
End: 2022-08-10

## 2022-08-10 VITALS
WEIGHT: 208 LBS | DIASTOLIC BLOOD PRESSURE: 80 MMHG | SYSTOLIC BLOOD PRESSURE: 136 MMHG | TEMPERATURE: 97.8 F | HEIGHT: 69 IN | OXYGEN SATURATION: 98 % | BODY MASS INDEX: 30.81 KG/M2 | HEART RATE: 89 BPM

## 2022-08-10 VITALS — SYSTOLIC BLOOD PRESSURE: 125 MMHG | DIASTOLIC BLOOD PRESSURE: 85 MMHG

## 2022-08-10 LAB
25(OH)D3 SERPL-MCNC: 18.5 NG/ML
ALBUMIN SERPL ELPH-MCNC: 5.1 G/DL
ALP BLD-CCNC: 65 U/L
ALT SERPL-CCNC: 10 U/L
ANION GAP SERPL CALC-SCNC: 12 MMOL/L
APPEARANCE: CLEAR
AST SERPL-CCNC: 15 U/L
BACTERIA: NEGATIVE
BASOPHILS # BLD AUTO: 0.08 K/UL
BASOPHILS NFR BLD AUTO: 1.2 %
BILIRUB SERPL-MCNC: 0.4 MG/DL
BILIRUBIN URINE: NEGATIVE
BLOOD URINE: NEGATIVE
BUN SERPL-MCNC: 10 MG/DL
CALCIUM SERPL-MCNC: 9.6 MG/DL
CHLORIDE SERPL-SCNC: 102 MMOL/L
CHOLEST SERPL-MCNC: 206 MG/DL
CO2 SERPL-SCNC: 25 MMOL/L
COLOR: COLORLESS
CREAT SERPL-MCNC: 0.55 MG/DL
EGFR: 120 ML/MIN/1.73M2
EOSINOPHIL # BLD AUTO: 0 K/UL
EOSINOPHIL NFR BLD AUTO: 0 %
ESTIMATED AVERAGE GLUCOSE: 103 MG/DL
GLUCOSE QUALITATIVE U: NEGATIVE
GLUCOSE SERPL-MCNC: 89 MG/DL
HBA1C MFR BLD HPLC: 5.2 %
HCT VFR BLD CALC: 42.4 %
HDLC SERPL-MCNC: 70 MG/DL
HGB BLD-MCNC: 13.2 G/DL
HIV1+2 AB SPEC QL IA.RAPID: NONREACTIVE
HYALINE CASTS: 0 /LPF
IMM GRANULOCYTES NFR BLD AUTO: 0.3 %
KETONES URINE: NEGATIVE
LDLC SERPL CALC-MCNC: 123 MG/DL
LEUKOCYTE ESTERASE URINE: NEGATIVE
LYMPHOCYTES # BLD AUTO: 1.68 K/UL
LYMPHOCYTES NFR BLD AUTO: 25.7 %
MAN DIFF?: NORMAL
MCHC RBC-ENTMCNC: 29.9 PG
MCHC RBC-ENTMCNC: 31.1 GM/DL
MCV RBC AUTO: 95.9 FL
MICROSCOPIC-UA: NORMAL
MONOCYTES # BLD AUTO: 0.46 K/UL
MONOCYTES NFR BLD AUTO: 7 %
NEUTROPHILS # BLD AUTO: 4.29 K/UL
NEUTROPHILS NFR BLD AUTO: 65.8 %
NITRITE URINE: NEGATIVE
NONHDLC SERPL-MCNC: 137 MG/DL
PH URINE: 6.5
PLATELET # BLD AUTO: 361 K/UL
POTASSIUM SERPL-SCNC: 4.4 MMOL/L
PROT SERPL-MCNC: 7.7 G/DL
PROTEIN URINE: NEGATIVE
RBC # BLD: 4.42 M/UL
RBC # FLD: 12.6 %
RED BLOOD CELLS URINE: 1 /HPF
SODIUM SERPL-SCNC: 139 MMOL/L
SPECIFIC GRAVITY URINE: 1
SQUAMOUS EPITHELIAL CELLS: 0 /HPF
T4 FREE SERPL-MCNC: 1.1 NG/DL
TRIGL SERPL-MCNC: 66 MG/DL
TSH SERPL-ACNC: 0.86 UIU/ML
UROBILINOGEN URINE: NORMAL
VIT B12 SERPL-MCNC: 448 PG/ML
WBC # FLD AUTO: 6.53 K/UL
WHITE BLOOD CELLS URINE: 0 /HPF

## 2022-08-10 PROCEDURE — 99214 OFFICE O/P EST MOD 30 MIN: CPT

## 2022-08-10 NOTE — ASSESSMENT
[FreeTextEntry1] : (1) HTN - stable, continue current medication.\par (2) Depression/anxiety - patient to start Wellbutrin XL 150mg daily.  Side effect profile reviewed with patient in office (dizziness, shaking, GI upset, allergic reactions).  Patient to follow-up in 3 months.  She should call if she has any issues with the medication.

## 2022-08-10 NOTE — HISTORY OF PRESENT ILLNESS
[de-identified] : Patient for follow-up of hypertension.  Patient remains on olmesartan 40mg daily.  Patient has no dizziness, lightheadedness, chest pain, dyspnea.  Patient has seen ortho recently, and will have PT for her labrum tear.\par \par Patient has ongoing depression with anxiety, largely related to her medical condition.  She has difficulty concentrating, and difficulty sleeping.  She has not been on medication.  She is interested in Wellbutrin due to a possible side effect of weight loss.

## 2022-10-07 ENCOUNTER — APPOINTMENT (OUTPATIENT)
Dept: INTERNAL MEDICINE | Facility: CLINIC | Age: 37
End: 2022-10-07

## 2022-10-07 PROCEDURE — 99441: CPT

## 2022-10-19 ENCOUNTER — NON-APPOINTMENT (OUTPATIENT)
Age: 37
End: 2022-10-19

## 2022-10-27 ENCOUNTER — NON-APPOINTMENT (OUTPATIENT)
Age: 37
End: 2022-10-27

## 2022-11-07 ENCOUNTER — EMERGENCY (EMERGENCY)
Facility: HOSPITAL | Age: 37
LOS: 0 days | Discharge: HOME | End: 2022-11-07
Attending: STUDENT IN AN ORGANIZED HEALTH CARE EDUCATION/TRAINING PROGRAM

## 2022-11-07 VITALS
SYSTOLIC BLOOD PRESSURE: 130 MMHG | HEART RATE: 64 BPM | RESPIRATION RATE: 17 BRPM | DIASTOLIC BLOOD PRESSURE: 81 MMHG | TEMPERATURE: 98 F | HEIGHT: 69 IN | OXYGEN SATURATION: 100 % | WEIGHT: 197.09 LBS

## 2022-11-07 VITALS
RESPIRATION RATE: 16 BRPM | OXYGEN SATURATION: 99 % | DIASTOLIC BLOOD PRESSURE: 84 MMHG | HEART RATE: 66 BPM | TEMPERATURE: 98 F | SYSTOLIC BLOOD PRESSURE: 123 MMHG

## 2022-11-07 DIAGNOSIS — S61.301A UNSPECIFIED OPEN WOUND OF LEFT INDEX FINGER WITH DAMAGE TO NAIL, INITIAL ENCOUNTER: ICD-10-CM

## 2022-11-07 DIAGNOSIS — Z87.39 PERSONAL HISTORY OF OTHER DISEASES OF THE MUSCULOSKELETAL SYSTEM AND CONNECTIVE TISSUE: ICD-10-CM

## 2022-11-07 DIAGNOSIS — Z91.040 LATEX ALLERGY STATUS: ICD-10-CM
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
## 2022-11-07 DIAGNOSIS — I10 ESSENTIAL (PRIMARY) HYPERTENSION: ICD-10-CM

## 2022-11-07 PROCEDURE — 99284 EMERGENCY DEPT VISIT MOD MDM: CPT

## 2022-11-07 PROCEDURE — 73130 X-RAY EXAM OF HAND: CPT | Mod: 26,LT

## 2022-11-07 RX ORDER — OXYCODONE HYDROCHLORIDE 5 MG/1
5 TABLET ORAL ONCE
Refills: 0 | Status: DISCONTINUED | OUTPATIENT
Start: 2022-11-07 | End: 2022-11-07

## 2022-11-07 RX ORDER — TETANUS TOXOID, REDUCED DIPHTHERIA TOXOID AND ACELLULAR PERTUSSIS VACCINE, ADSORBED 5; 2.5; 8; 8; 2.5 [IU]/.5ML; [IU]/.5ML; UG/.5ML; UG/.5ML; UG/.5ML
0.5 SUSPENSION INTRAMUSCULAR ONCE
Refills: 0 | Status: DISCONTINUED | OUTPATIENT
Start: 2022-11-07 | End: 2022-11-07

## 2022-11-07 RX ADMIN — OXYCODONE HYDROCHLORIDE 5 MILLIGRAM(S): 5 TABLET ORAL at 07:41

## 2022-11-07 RX ADMIN — Medication 1 TABLET(S): at 06:32

## 2022-11-07 RX ADMIN — OXYCODONE HYDROCHLORIDE 5 MILLIGRAM(S): 5 TABLET ORAL at 06:32

## 2022-11-07 NOTE — ED PROVIDER NOTE - CARE PROVIDER_API CALL
Asia Wallace (MD)  Plastic Surgery  [address] [place] [address], [address]  [place], [place] [id number]  Phone: [phone]  Fax: [phone]  Follow Up Time:

## 2022-11-07 NOTE — ED ADULT NURSE NOTE - NS PRO PASSIVE SMOKE EXP
Providers


Date of admission: 


06/02/17 06:28





Expected date of discharge: 06/03/17


Attending physician: 


Christiano Canales





Primary care physician: 


[facility] Course: 





The patient presented for laproscopic Nissen fundoplication.  Postoperatively 

his pain was well controlled.  He had a unremarkable barium swallow.  Postop 

day 1 he was found to be stable for discharge


Procedures: 





Laparoscopic Nissen fundoplication


Patient Condition at Discharge: Good





Plan - Discharge Summary


New Discharge Prescriptions: 


New


   Docusate [Colace] 100 mg PO BID #20 capsule


   HYDROcodone/APAP 7.5-325MG [Norco 7.5] 1 each PO Q4H PRN #60 tab


     PRN Reason: Pain





No Action


   Ranitidine HCl [Zantac] 150 mg PO BID


   Albuterol Inhaler [Ventolin Hfa Inhaler] 1 - 2 puff INHALATION RT-Q6H PRN


     PRN Reason: ASTHMA


   Loratadine [Claritin] 10 mg PO DAILY


Discharge Medication List





Albuterol Inhaler [Ventolin Hfa Inhaler] 1 - 2 puff INHALATION RT-Q6H PRN 04/06/ 17 [History]


Loratadine [Claritin] 10 mg PO DAILY 04/06/17 [History]


Ranitidine HCl [Zantac] 150 mg PO BID 04/06/17 [History]


Docusate [Colace] 100 mg PO BID #20 capsule 06/02/17 [Rx]


HYDROcodone/APAP 7.5-325MG [Norco 7.5] 1 each PO Q4H PRN #60 tab 06/02/17 [Rx]








Follow up Appointment(s)/Referral(s): 


Christiano Canales MD [STAFF PHYSICIAN] - 2 Weeks (Office closed at time of 

discharge. Patient to call and schedule follow up appointment.)


Activity/Diet/Wound Care/Special Instructions: 


Full liquid diet for 2 weeks


Discharge Disposition: HOME SELF-CARE
No

## 2022-11-07 NOTE — ED ADULT TRIAGE NOTE - CHIEF COMPLAINT QUOTE
left second finger laceration with a kitchen knife 30 minutes ago, no active bleeding noted. Last tetanus shot 4/22.

## 2022-11-07 NOTE — ED PROVIDER NOTE - CLINICAL SUMMARY MEDICAL DECISION MAKING FREE TEXT BOX
Avulsed finger, no bone involvement, (+) nailbed involved. left index finger, left handedness.   - pain control, XR, bulky dressing, rx augmetin  - follow up with dr brigida kaufman for possible skin graft

## 2022-11-07 NOTE — ED PROVIDER NOTE - PATIENT PORTAL LINK FT
You can access the FollowMyHealth Patient Portal offered by [facility] by registering at the following website: [email or web address]. By joining [email or web address]’s FollowMyHealth portal, you will also be able to view your health information using other applications (apps) compatible with our system.

## 2022-11-07 NOTE — ED PROVIDER NOTE - PROGRESS NOTE DETAILS
discussed with dr allred hand surgery, option to suture avulsed salvaged finger tip and wrap bulky dressing w/ follow up for possible skin graft OR wrap bulky dressing only and follow up. Avulsed finger likely nonviable. D/w patient about options, patient preferred to have bulky dressing and NOT to reattach avulsed finger. Rx augmentin and pain control.

## 2022-11-07 NOTE — ED PROVIDER NOTE - PHYSICAL EXAMINATION
VITAL SIGNS: I have reviewed nursing notes and confirm.   GEN: Well-developed; well-nourished; in no acute distress. Speaking full sentences.  SKIN: Warm, pink, no rash, no diaphoresis, no cyanosis, well perfused.   HEAD: Normocephalic; atraumatic. No scalp lacerations, no abrasions.  NECK: Supple; non tender.   EYES: Pupils 3mm equal, round, reactive to light and accomodation, conjunctiva and sclera clear. Extra-ocular movements intact bilaterally.  ENT: No nasal discharge; airway clear. Trachea is midline. ORAL: No oropharyngeal exudates or erythema. Normal dentition.  CV: Regular rate and rhythm. S1, S2 normal; no murmurs, gallops, or rubs. No lower extremity pitting edema bilaterally. Capillary refill < 2 seconds throughout. Distal pulses intact 2+ throughout.  RESP: CTA bilaterally. No wheezes, rales, or rhonchi.   ABD: Normal bowel sounds, soft, non-distended, non-tender, no rebound, no guarding, no rigidity, no hepatosplenomegaly. No CVA tenderness bilaterally.  MSK: Normal range of motion and movement of all 4 extremities. No joint or muscular pain throughout. No clubbing. EXCEPT: (+) LEFT index finger, distal tip, dorsum radial aspect oblique cut involving the nail bed base. No exposed bone, cap refill < 2 sec. sensation to soft touch intact.  BACK: No thoracolumbar midline or paravertebral tenderness. No step-offs or obvious deformities.  NEURO: Alert & oriented x 3, Grossly unremarkable. Sensory and motor intact throughout. No focal deficits. Gait: Fluid. Normal speech and coordination.   PSYCH: Cooperative, appropriate.

## 2022-11-07 NOTE — ED PROVIDER NOTE - OBJECTIVE STATEMENT
37F PMHx of chronic cord compression, htn presenting with left index finger avulsion s/p knife at 245AM. Unintentional cut of left index finger tip, with bleeding and moderate pain. Denies any exposed bone, fevers, vomiting, chills, other traumatic injuries, rashes, anticoagulation use, weakness, numbness/tingling. Packed avulsed finger in ice pack and came to the ED, however in luke warm water on interview.

## 2022-11-07 NOTE — ED ADULT NURSE NOTE - OBJECTIVE STATEMENT
37 year old female left second finger laceration with a kitchen knife 30 minutes ago, no active bleeding noted. Last tetanus shot 4/22/2022, denies blood thinners at this time, denies injuries at this itme

## 2022-11-07 NOTE — ED PROVIDER NOTE - NSFOLLOWUPINSTRUCTIONS_ED_ALL_ED_FT
Follow up with Dr. Wallace Hand surgery in 2-5 days for further evaluation of a skin graft.    Rest, drink plenty of fluids.  Advance activity as tolerated.  Continue all previously prescribed medications as directed.  Follow up with your PMD 2-3 days and bring copies of your results.  Return to the ER for worsening symptoms, fevers, redness, vomiting, weakness, numbness/tingling, or new concerning symptoms.    Take acetaminophen 650 mg orally every 6-8 hours for pain control as needed. Please do not exceed 4,000 mg of acetaminophen during a 24 hours period. Acetaminophen can be found in many over-the-counter cold medications as well as opioid medications that may be given for pain.    Take ibuprofen (also known as MOTRIN or ADVIL) 400 mg orally every 6-8 hours for pain control as needed with food to avoid an upset stomach. Ibuprofen can be found in many over-the-counter medications. Please do not take ibuprofen if you have a bleeding disorder, stomach or gastrointestinal ulcer, or liver disease.    If needed, you can alternate these medications so that you can take one medication every 3 hours. For example, at noon take ibuprofen, then at 3PM take acetaminophen, then at 6PM take ibuprofen.

## 2022-11-16 ENCOUNTER — APPOINTMENT (OUTPATIENT)
Dept: INTERNAL MEDICINE | Facility: CLINIC | Age: 37
End: 2022-11-16

## 2022-11-16 VITALS
OXYGEN SATURATION: 99 % | HEART RATE: 63 BPM | HEIGHT: 69 IN | WEIGHT: 202 LBS | DIASTOLIC BLOOD PRESSURE: 80 MMHG | TEMPERATURE: 97.3 F | SYSTOLIC BLOOD PRESSURE: 112 MMHG | BODY MASS INDEX: 29.92 KG/M2

## 2022-11-16 VITALS — SYSTOLIC BLOOD PRESSURE: 128 MMHG | DIASTOLIC BLOOD PRESSURE: 80 MMHG

## 2022-11-16 DIAGNOSIS — Z23 ENCOUNTER FOR IMMUNIZATION: ICD-10-CM

## 2022-11-16 PROCEDURE — 90686 IIV4 VACC NO PRSV 0.5 ML IM: CPT

## 2022-11-16 PROCEDURE — G0008: CPT

## 2022-11-16 PROCEDURE — 99214 OFFICE O/P EST MOD 30 MIN: CPT | Mod: 25

## 2022-11-16 RX ORDER — BUPROPION HYDROCHLORIDE 150 MG/1
150 TABLET, EXTENDED RELEASE ORAL
Qty: 30 | Refills: 0 | Status: COMPLETED | COMMUNITY
Start: 2022-08-10

## 2022-11-16 RX ORDER — PANTOPRAZOLE 40 MG/1
40 TABLET, DELAYED RELEASE ORAL DAILY
Qty: 30 | Refills: 1 | Status: COMPLETED | COMMUNITY
Start: 2022-02-18 | End: 2022-11-16

## 2022-11-16 RX ORDER — GABAPENTIN 100 MG/1
100 CAPSULE ORAL
Qty: 270 | Refills: 0 | Status: COMPLETED | COMMUNITY
Start: 2022-08-15

## 2022-11-16 RX ORDER — CYCLOBENZAPRINE HYDROCHLORIDE 5 MG/1
5 TABLET, FILM COATED ORAL
Qty: 30 | Refills: 1 | Status: COMPLETED | COMMUNITY
Start: 2020-10-27 | End: 2022-11-16

## 2022-11-16 RX ORDER — LORAZEPAM 1 MG/1
1 TABLET ORAL AT BEDTIME
Refills: 0 | Status: COMPLETED | COMMUNITY
End: 2022-11-16

## 2022-11-16 RX ORDER — OXYCODONE AND ACETAMINOPHEN 5; 325 MG/1; MG/1
5-325 TABLET ORAL
Qty: 9 | Refills: 0 | Status: COMPLETED | COMMUNITY
Start: 2022-11-07

## 2022-11-16 RX ORDER — AMOXICILLIN AND CLAVULANATE POTASSIUM 875; 125 MG/1; MG/1
875-125 TABLET, COATED ORAL
Qty: 28 | Refills: 0 | Status: COMPLETED | COMMUNITY
Start: 2022-11-07

## 2022-11-16 NOTE — ASSESSMENT
[FreeTextEntry1] : (1) Hypertension - continue current dose of olmesartan.\par \par (2) s/p COVID-19 - patient has recovered.  She can get the bivalent booster Jan 2023 (3 months after the recent COVID-19 infection.\par \par (3) Depression with anxiety - patient with improvement on the bupropion ER and is tolerating the present dose.\par \par (4) Influenza vaccination given today.\par \par Follow-up for physical in Mar 2023.

## 2022-11-16 NOTE — HISTORY OF PRESENT ILLNESS
[de-identified] : Patient had a SI joint injection at [facility] 10/25/2022.  She ended up getting COVID-19 afterwards.  She had congestion, cough, nausea, vomiting, abdominal pain, diarrhea.  She also had dyspnea.  She saw her pulmonologist (Dr. Hurst).  D-dimer was negative.  She did not have imaging.  She had an albuterol inhaler.  She had no loss of taste or smell.  Her symptoms have resolved.\par \par Last Monday - 11/7/2022, patient was cutting a piece of cheese at home and sliced off an oblique piece of the 2nd digit of her L hand.  She went to [facility] and later a hand surgeon.  There was no need for surgery or any wound care other than a band aid.  She was told she didn't need antibiotics by the hand surgeon.\par \par Patient notes that she got a Tdap 4/23/2022.  She will take the influenza vaccination.  She will get the bivalent COVID-19 booster three months after her recent COVID-19 infection (Jan 2023).\par \par Patient feels that she is doing well on the current dose of bupropion ER.  Her mood and concentration have both improved.

## 2022-12-12 ENCOUNTER — RX RENEWAL (OUTPATIENT)
Age: 37
End: 2022-12-12

## 2022-12-30 ENCOUNTER — APPOINTMENT (OUTPATIENT)
Dept: INTERNAL MEDICINE | Facility: CLINIC | Age: 37
End: 2022-12-30
Payer: COMMERCIAL

## 2022-12-30 VITALS
DIASTOLIC BLOOD PRESSURE: 84 MMHG | TEMPERATURE: 97.3 F | SYSTOLIC BLOOD PRESSURE: 130 MMHG | HEART RATE: 68 BPM | OXYGEN SATURATION: 99 %

## 2022-12-30 DIAGNOSIS — R53.81 OTHER MALAISE: ICD-10-CM

## 2022-12-30 DIAGNOSIS — R53.83 OTHER MALAISE: ICD-10-CM

## 2022-12-30 LAB
CORTIS SERPL-MCNC: 10.3 UG/DL
COVID-19 NUCLEOCAPSID  GAM ANTIBODY INTERPRETATION: POSITIVE
COVID-19 SPIKE DOMAIN ANTIBODY INTERPRETATION: POSITIVE
FERRITIN SERPL-MCNC: 52 NG/ML
FOLATE SERPL-MCNC: 17.5 NG/ML
IRON SATN MFR SERPL: 18 %
IRON SERPL-MCNC: 70 UG/DL
LDH SERPL-CCNC: 166 U/L
SARS-COV-2 AB SERPL IA-ACNC: >250 U/ML
SARS-COV-2 AB SERPL QL IA: 1.08 INDEX
TIBC SERPL-MCNC: 392 UG/DL
UIBC SERPL-MCNC: 323 UG/DL
VIT B12 SERPL-MCNC: 541 PG/ML

## 2022-12-30 PROCEDURE — 99214 OFFICE O/P EST MOD 30 MIN: CPT | Mod: 25

## 2022-12-30 PROCEDURE — 36415 COLL VENOUS BLD VENIPUNCTURE: CPT

## 2022-12-30 RX ORDER — TRAMADOL HYDROCHLORIDE 50 MG/1
50 TABLET, COATED ORAL
Qty: 180 | Refills: 0 | Status: COMPLETED | COMMUNITY
End: 2022-12-30

## 2022-12-30 RX ORDER — PREGABALIN 100 MG/1
100 CAPSULE ORAL 3 TIMES DAILY
Refills: 0 | Status: COMPLETED | COMMUNITY
End: 2022-12-30

## 2022-12-30 NOTE — ASSESSMENT
[FreeTextEntry1] : Patient with mild anemia of unclear cause.  Will send anemia panel, and patient can do home stool guaiac.  She has been keeping up with her colon cancer screening.  She did have COVID-19 in early November.  Will send anemia panel.  Patient requests a cortisol level due to the fatigue (? was elevated in the past, but not low).  She also requests COVID-19 antibodies, although I advised her that we do not have guidelines on regular use of those antibodies.  She should follow CDC recommendations regardless of these antibodies.\par \par Labs drawn in office as below.

## 2022-12-30 NOTE — HISTORY OF PRESENT ILLNESS
[de-identified] : Patient for follow-up of mild anemia.  Patient had Hgb 11.5 in November with her Cardiologist.  MCV 96.  She has not had bleeding or bruising.  She started some iron on her own (iron level had not been checked).  Diet has been good.  No chest pain, dyspnea.  There is some fatigue.

## 2023-01-11 ENCOUNTER — APPOINTMENT (OUTPATIENT)
Dept: INTERNAL MEDICINE | Facility: CLINIC | Age: 38
End: 2023-01-11
Payer: COMMERCIAL

## 2023-01-11 DIAGNOSIS — D64.9 ANEMIA, UNSPECIFIED: ICD-10-CM

## 2023-01-11 LAB
ALBUMIN MFR SERPL ELPH: 60.8 %
ALBUMIN SERPL-MCNC: 4.6 G/DL
ALBUMIN/GLOB SERPL: 1.6 RATIO
ALPHA1 GLOB MFR SERPL ELPH: 3.6 %
ALPHA1 GLOB SERPL ELPH-MCNC: 0.3 G/DL
ALPHA2 GLOB MFR SERPL ELPH: 10 %
ALPHA2 GLOB SERPL ELPH-MCNC: 0.8 G/DL
B-GLOBULIN MFR SERPL ELPH: 10.1 %
B-GLOBULIN SERPL ELPH-MCNC: 0.8 G/DL
BASOPHILS # BLD AUTO: 0.07 K/UL
BASOPHILS NFR BLD AUTO: 0.7 %
DEPRECATED KAPPA LC FREE/LAMBDA SER: 1.28 RATIO
EOSINOPHIL # BLD AUTO: 0 K/UL
EOSINOPHIL NFR BLD AUTO: 0 %
GAMMA GLOB FLD ELPH-MCNC: 1.2 G/DL
GAMMA GLOB MFR SERPL ELPH: 15.5 %
HCT VFR BLD CALC: 40.3 %
HEMOCCULT STL QL IA: POSITIVE
HGB A MFR BLD: 97.9 %
HGB A2 MFR BLD: 2.1 %
HGB BLD-MCNC: 13.1 G/DL
HGB FRACT BLD-IMP: NORMAL
IGA SER QL IEP: 81 MG/DL
IGG SER QL IEP: 1260 MG/DL
IGM SER QL IEP: 113 MG/DL
IMM GRANULOCYTES NFR BLD AUTO: 0.3 %
INTERPRETATION SERPL IEP-IMP: NORMAL
KAPPA LC CSF-MCNC: 1.26 MG/DL
KAPPA LC SERPL-MCNC: 1.61 MG/DL
LYMPHOCYTES # BLD AUTO: 3.36 K/UL
LYMPHOCYTES NFR BLD AUTO: 31.9 %
M PROTEIN SPEC IFE-MCNC: NORMAL
MAN DIFF?: NORMAL
MCHC RBC-ENTMCNC: 31 PG
MCHC RBC-ENTMCNC: 32.5 GM/DL
MCV RBC AUTO: 95.3 FL
MONOCYTES # BLD AUTO: 0.85 K/UL
MONOCYTES NFR BLD AUTO: 8.1 %
NEUTROPHILS # BLD AUTO: 6.22 K/UL
NEUTROPHILS NFR BLD AUTO: 59 %
PLATELET # BLD AUTO: 293 K/UL
PROT SERPL-MCNC: 7.5 G/DL
PROT SERPL-MCNC: 7.5 G/DL
RBC # BLD: 4.23 M/UL
RBC # FLD: 12.4 %
WBC # FLD AUTO: 10.53 K/UL

## 2023-01-11 PROCEDURE — 99442: CPT

## 2023-03-07 NOTE — ASSESSMENT
[FreeTextEntry1] : (1) HCM - discussed diet, exercise, weight maintenance.  Labs ordered as below, including HIV and Hepatitis C screening.  Patient reports an influenza vaccination this past season, and a Tdap approximately 2013.  She also reports the 1st Pfizer COVID-19 vaccine 3/2/2021.  She has had standard childhood vaccines and also the  Hepatitis B series.  She will have records forwarded from her prior PCP.  Patient had a PAP smear Feb 2021, and will follow-up with Gyn as directed.  Screening mammogram 3/2021, and will have 6 month follow-up.  Patient also to see Dr. Carballo (GI) soon and will discuss timing of follow-up colonoscopy (h/o polyp).  She will have prior endoscopies forwarded to me.  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  She sees Dr. Justin Hooks who is prescribing gabapentin and cyclobenzaprine.  Patient avoiding NSAIDs due to GERD.\par \par (3) CV - hypertension adequately controlled on Benicar.\par \par (4) GI - patient on both H2 blocker and PPI, and will discuss this with Dr. Carballo at an upcoming new patient appointment.
no gum bleeding/no nose bleeding

## 2023-03-20 ENCOUNTER — APPOINTMENT (OUTPATIENT)
Dept: INTERNAL MEDICINE | Facility: CLINIC | Age: 38
End: 2023-03-20
Payer: COMMERCIAL

## 2023-03-20 VITALS
OXYGEN SATURATION: 99 % | TEMPERATURE: 97.3 F | SYSTOLIC BLOOD PRESSURE: 106 MMHG | DIASTOLIC BLOOD PRESSURE: 64 MMHG | HEART RATE: 79 BPM | HEIGHT: 69 IN | BODY MASS INDEX: 30.07 KG/M2 | WEIGHT: 203 LBS | RESPIRATION RATE: 16 BRPM

## 2023-03-20 DIAGNOSIS — K21.9 GASTRO-ESOPHAGEAL REFLUX DISEASE W/OUT ESOPHAGITIS: ICD-10-CM

## 2023-03-20 PROCEDURE — 99395 PREV VISIT EST AGE 18-39: CPT | Mod: 25

## 2023-03-20 PROCEDURE — 36415 COLL VENOUS BLD VENIPUNCTURE: CPT

## 2023-03-20 PROCEDURE — G0444 DEPRESSION SCREEN ANNUAL: CPT | Mod: 59

## 2023-03-20 NOTE — ASSESSMENT
[FreeTextEntry1] : (1) HCM - discussed diet, exercise, weight maintenance.  Labs ordered in office as below, including HIV screening.  Hepatitis C screen was negative March 2021.  Patient received 3 doses of the initial Pfizer COVID-19 vaccine.  She had the bivalent Pfizer COVID-19 vaccine Jan 2023 and will forward the date.  She received the influenza vaccination this past season, and a Tdap 4/23/2022.  She has had standard childhood vaccines and also the  Hepatitis B series.  She will have records forwarded from her prior PCP.  Follow-up with Gyn as directed.  She had a baseline breast imaging and will start regular screening at age 38.  Screening colonoscopy March 2023 with Dr. Justin Bardales was normal, with 5 year follow-up recommended.  Prior colonoscopy 7/2021 with Dr. Carballo showed 1 hyperplastic polyp.  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  She sees Dr. Justin Hooks who is prescribing Lyrica and cyclobenzaprine.  Patient avoiding NSAIDs due to GERD.  She may have surgery soon.\par \par (3) CV - patient now off the Benicar.  She is on a new diet, and no longer needs it.  She will follow-up with me periodically.\par \par (4) GI - patient with gastritis on EGD, and currently managing with diet control.  She is not on the PPI regularly.  Follow-up with Dr. Bardales as directed.\par \par (5) Psych - patient now seeing Dr. Mayer (psychiatry) for management of anxiety/depression.  She is on bupropion ER and doxepin.  Follow-up as directed.

## 2023-03-20 NOTE — PHYSICAL EXAM
[No Acute Distress] : no acute distress [Well Nourished] : well nourished [Well Developed] : well developed [Well-Appearing] : well-appearing [Normal Voice/Communication] : normal voice/communication [Normal Sclera/Conjunctiva] : normal sclera/conjunctiva [PERRL] : pupils equal round and reactive to light [EOMI] : extraocular movements intact [Normal Outer Ear/Nose] : the outer ears and nose were normal in appearance [Normal Oropharynx] : the oropharynx was normal [Normal TMs] : both tympanic membranes were normal [No JVD] : no jugular venous distention [No Lymphadenopathy] : no lymphadenopathy [Supple] : supple [Thyroid Normal, No Nodules] : the thyroid was normal and there were no nodules present [No Respiratory Distress] : no respiratory distress  [No Accessory Muscle Use] : no accessory muscle use [Clear to Auscultation] : lungs were clear to auscultation bilaterally [Normal Rate] : normal rate  [Regular Rhythm] : with a regular rhythm [Normal S1, S2] : normal S1 and S2 [No Murmur] : no murmur heard [No Carotid Bruits] : no carotid bruits [No Abdominal Bruit] : a ~M bruit was not heard ~T in the abdomen [No Varicosities] : no varicosities [Pedal Pulses Present] : the pedal pulses are present [No Edema] : there was no peripheral edema [No Palpable Aorta] : no palpable aorta [No Extremity Clubbing/Cyanosis] : no extremity clubbing/cyanosis [Normal Appearance] : normal in appearance [No Nipple Discharge] : no nipple discharge [No Axillary Lymphadenopathy] : no axillary lymphadenopathy [Soft] : abdomen soft [Non Tender] : non-tender [Non-distended] : non-distended [No Masses] : no abdominal mass palpated [No HSM] : no HSM [Normal Bowel Sounds] : normal bowel sounds [No Hernias] : no hernias [No CVA Tenderness] : no CVA  tenderness [No Spinal Tenderness] : no spinal tenderness [No Joint Swelling] : no joint swelling [Grossly Normal Strength/Tone] : grossly normal strength/tone [No Rash] : no rash [Coordination Grossly Intact] : coordination grossly intact [No Focal Deficits] : no focal deficits [Normal Gait] : normal gait [Deep Tendon Reflexes (DTR)] : deep tendon reflexes were 2+ and symmetric [Normal Affect] : the affect was normal [Normal Insight/Judgement] : insight and judgment were intact

## 2023-03-20 NOTE — HEALTH RISK ASSESSMENT
[Excellent] : ~his/her~  mood as  excellent [No] : In the past 12 months have you used drugs other than those required for medical reasons? No [No falls in past year] : Patient reported no falls in the past year [Patient reported mammogram was abnormal] : Patient reported mammogram was abnormal [Patient reported PAP Smear was normal] : Patient reported PAP Smear was normal [Patient reported colonoscopy was abnormal] : Patient reported colonoscopy was abnormal [HIV Test offered] : HIV Test offered [Alone] : lives alone [On disability] : on disability [Single] : single [Feels Safe at Home] : Feels safe at home [Fully functional (bathing, dressing, toileting, transferring, walking, feeding)] : Fully functional (bathing, dressing, toileting, transferring, walking, feeding) [Fully functional (using the telephone, shopping, preparing meals, housekeeping, doing laundry, using] : Fully functional and needs no help or supervision to perform IADLs (using the telephone, shopping, preparing meals, housekeeping, doing laundry, using transportation, managing medications and managing finances) [Smoke Detector] : smoke detector [Carbon Monoxide Detector] : carbon monoxide detector [Sunscreen] : uses sunscreen [Seat Belt] :  uses seat belt [With Patient/Caregiver] : , with patient/caregiver [Monthly or less (1 pt)] : Monthly or less (1 point) [2] : 2) Feeling down, depressed, or hopeless for more than half of the days (2) [PHQ-2 Positive] : PHQ-2 Positive [Nearly Every Day (3)] : 4.) Feeling tired or having little energy? Nearly every day [Several Days (1)] : 6.) Feeling bad about yourself, or that you are a failure, or have let yourself or your family down? Several days [1/2 of Days or More (2)] : 8.) Moving or speaking so slowly that other people could have noticed, or the opposite, moving or speaking faster than usual? Half the days or more [Moderately Severe] : severity of depression is moderately severe [Somewhat Difficult] : How difficult have these problems made it for you to do your work, take care of things at home, or get along with people? Somewhat difficult [I have developed a follow-up plan documented below in the note.] : I have developed a follow-up plan documented below in the note. [Designated Healthcare Proxy] : Designated healthcare proxy [Name: ___] : Health Care Proxy's Name: [unfilled]  [Relationship: ___] : Relationship: [unfilled] [de-identified] : Limited by back pain / herniated disc..   Some treadmill - 15 to 40min. [AIS1Tniqs] : 4 [[id number]] : 17 [Change in mental status noted] : No change in mental status noted [Language] : denies difficulty with language [Behavior] : denies difficulty with behavior [Handling Complex Tasks] : denies difficulty handling complex tasks [Reasoning] : denies difficulty with reasoning [Sexually Active] : not sexually active [Reports changes in hearing] : Reports no changes in hearing [Reports changes in vision] : Reports no changes in vision [Reports changes in dental health] : Reports no changes in dental health [MammogramDate] : 3/21 [MammogramComments] : Patient was advised to start regular screening age 38. [PapSmearDate] : 2/21 [PapSmearComments] : Dr. Carlos Camara [ColonoscopyDate] : 03/23 [ColonoscopyComments] : Mar 2023 - Dr. Justin Bardales - no polyps.  5 year follow-up.  7/1/2021 - Dr. Carballo, 1 hyperplastic polyp.  3 year follow-up. [HepatitisCDate] : 03/21 [HepatitisCComments] : negative [de-identified] : Drives a car. [de-identified] : February 2023.  No vision correction.  Dr. Kain Ford. [de-identified] : Going regularly. [AdvancecareDate] : 03/20/2023 [FreeTextEntry4] : Health Care Proxy on chart.

## 2023-03-20 NOTE — HISTORY OF PRESENT ILLNESS
[de-identified] : Patient presents for a follow-up annual physical.  Patient is a 37 year old female with a history of PCOS, GERD, depression/anxiety and herniated disc.  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  She had herniation of L4-L5 with nerve compression.  She went on medical leave and is on workers compensation.  She sees Dr. Justin Hooks (Neuro).  She had an epidural which did not help her symptoms.  Patient had a nerve ablation 11/2/2021 in the lumbar spine with Dr. Lomeli.  She had been on ibuprofen, but couldn't tolerate it due to her GERD.  She is currently on prn tramadol and Lyrica.  She is planned to have decompression surgery, possibly spring 2023.\par \par Patient had a normal thyroid US about 2014.  Her PCP at the time felt that the thyroid was enlarged.  She went to an Endocrinologist at the time as well and reports that there were no significant findings.  Recently, her Gyn felt it was enlarged as well.  Patient does not have symptoms of an overactive or underactive thyroid.\par \par Patient had follow-up of microcysts followed by breast US.  She says she was advised by her Gyn to start regular breast cancer screening at age 38.\par \par Patient had an injection at [facility] 10/25/2022, and then got COVID-19 afterwards.  She saw her pulmonologist (Dr. Hurst), and got inhalers.  She has recovered.  She also accidentally cut the 2nd digit of her L hand when cutting a piece of cheese at home.  She was seen at [facility], and did not need surgery or wound care other than a Band-aid.\par \par Patient went to Dr. Justin Bardales (GI) early March 2023 for BRPBR.  EGD showed gastritis, and colonoscopy normal.  She stooped taking PPIs, and manages with diet control.  She had anemia, which subsequently resolved.\par \par Patient now seeing psychiatry (Dr. Morris Mayer) for depression/anxiety, and her bupropion is now 450mg.  She is on doxepin 50mg qhs to help sleep.\par \par Patient is seeing a  to help lose weight.  Patient has lost weight, and she is not taking her BP medication.\par \par Patient had the bivalent Pfizer COVID-19 vaccine Jan 2023.

## 2023-03-21 ENCOUNTER — TRANSCRIPTION ENCOUNTER (OUTPATIENT)
Age: 38
End: 2023-03-21

## 2023-03-21 LAB
25(OH)D3 SERPL-MCNC: 30.9 NG/ML
ALBUMIN SERPL ELPH-MCNC: 4.4 G/DL
ALP BLD-CCNC: 75 U/L
ALT SERPL-CCNC: 20 U/L
ANION GAP SERPL CALC-SCNC: 11 MMOL/L
APPEARANCE: CLEAR
AST SERPL-CCNC: 17 U/L
BACTERIA: NEGATIVE
BASOPHILS # BLD AUTO: 0.05 K/UL
BASOPHILS NFR BLD AUTO: 0.9 %
BILIRUB SERPL-MCNC: 0.2 MG/DL
BILIRUBIN URINE: NEGATIVE
BLOOD URINE: NEGATIVE
BUN SERPL-MCNC: 4 MG/DL
CALCIUM SERPL-MCNC: 9.1 MG/DL
CHLORIDE SERPL-SCNC: 97 MMOL/L
CHOLEST SERPL-MCNC: 166 MG/DL
CO2 SERPL-SCNC: 26 MMOL/L
COLOR: NORMAL
CREAT SERPL-MCNC: 0.67 MG/DL
EGFR: 114 ML/MIN/1.73M2
EOSINOPHIL # BLD AUTO: 0.11 K/UL
EOSINOPHIL NFR BLD AUTO: 1.9 %
ESTIMATED AVERAGE GLUCOSE: 103 MG/DL
GLUCOSE QUALITATIVE U: NEGATIVE
GLUCOSE SERPL-MCNC: 80 MG/DL
HBA1C MFR BLD HPLC: 5.2 %
HCT VFR BLD CALC: 38.4 %
HDLC SERPL-MCNC: 48 MG/DL
HGB BLD-MCNC: 12.1 G/DL
HIV1+2 AB SPEC QL IA.RAPID: NONREACTIVE
HYALINE CASTS: 0 /LPF
IMM GRANULOCYTES NFR BLD AUTO: 0.2 %
KETONES URINE: NEGATIVE
LDLC SERPL CALC-MCNC: 101 MG/DL
LEUKOCYTE ESTERASE URINE: NEGATIVE
LYMPHOCYTES # BLD AUTO: 2.1 K/UL
LYMPHOCYTES NFR BLD AUTO: 37 %
MAN DIFF?: NORMAL
MCHC RBC-ENTMCNC: 30.8 PG
MCHC RBC-ENTMCNC: 31.5 GM/DL
MCV RBC AUTO: 97.7 FL
MICROSCOPIC-UA: NORMAL
MONOCYTES # BLD AUTO: 0.45 K/UL
MONOCYTES NFR BLD AUTO: 7.9 %
NEUTROPHILS # BLD AUTO: 2.95 K/UL
NEUTROPHILS NFR BLD AUTO: 52.1 %
NITRITE URINE: NEGATIVE
NONHDLC SERPL-MCNC: 118 MG/DL
PH URINE: 6.5
PLATELET # BLD AUTO: 284 K/UL
POTASSIUM SERPL-SCNC: 4.7 MMOL/L
PROT SERPL-MCNC: 7.1 G/DL
PROTEIN URINE: NEGATIVE
RBC # BLD: 3.93 M/UL
RBC # FLD: 12.4 %
RED BLOOD CELLS URINE: 2 /HPF
SODIUM SERPL-SCNC: 135 MMOL/L
SPECIFIC GRAVITY URINE: 1.01
SQUAMOUS EPITHELIAL CELLS: 1 /HPF
T4 FREE SERPL-MCNC: 1.1 NG/DL
TRIGL SERPL-MCNC: 82 MG/DL
TSH SERPL-ACNC: 0.93 UIU/ML
UROBILINOGEN URINE: NORMAL
VIT B12 SERPL-MCNC: 973 PG/ML
WBC # FLD AUTO: 5.67 K/UL
WHITE BLOOD CELLS URINE: 0 /HPF

## 2023-06-02 ENCOUNTER — APPOINTMENT (OUTPATIENT)
Dept: INTERNAL MEDICINE | Facility: CLINIC | Age: 38
End: 2023-06-02
Payer: SELF-PAY

## 2023-06-02 VITALS
HEART RATE: 98 BPM | DIASTOLIC BLOOD PRESSURE: 80 MMHG | SYSTOLIC BLOOD PRESSURE: 120 MMHG | TEMPERATURE: 97.2 F | BODY MASS INDEX: 28.93 KG/M2 | OXYGEN SATURATION: 99 % | WEIGHT: 195.3 LBS | HEIGHT: 69 IN

## 2023-06-02 DIAGNOSIS — Z01.818 ENCOUNTER FOR OTHER PREPROCEDURAL EXAMINATION: ICD-10-CM

## 2023-06-02 DIAGNOSIS — G47.33 OBSTRUCTIVE SLEEP APNEA (ADULT) (PEDIATRIC): ICD-10-CM

## 2023-06-02 DIAGNOSIS — M62.838 OTHER MUSCLE SPASM: ICD-10-CM

## 2023-06-02 PROCEDURE — 99214 OFFICE O/P EST MOD 30 MIN: CPT | Mod: 25

## 2023-06-02 RX ORDER — DIAZEPAM 5 MG/1
5 TABLET ORAL 4 TIMES DAILY
Refills: 0 | Status: ACTIVE | COMMUNITY
Start: 2022-11-10

## 2023-06-02 RX ORDER — ALBUTEROL SULFATE 90 UG/1
108 (90 BASE) INHALANT RESPIRATORY (INHALATION)
Refills: 0 | Status: ACTIVE | COMMUNITY

## 2023-06-02 RX ORDER — PREGABALIN 200 MG/1
200 CAPSULE ORAL
Refills: 0 | Status: ACTIVE | COMMUNITY
Start: 2022-11-10

## 2023-06-09 NOTE — HISTORY OF PRESENT ILLNESS
[No Pertinent Cardiac History] : no history of aortic stenosis, atrial fibrillation, coronary artery disease, recent myocardial infarction, or implantable device/pacemaker [Asthma] : asthma [Sleep Apnea] : sleep apnea [No Adverse Anesthesia Reaction] : no adverse anesthesia reaction in self or family member [(Patient denies any chest pain, claudication, dyspnea on exertion, orthopnea, palpitations or syncope)] : Patient denies any chest pain, claudication, dyspnea on exertion, orthopnea, palpitations or syncope [Moderate (4-6 METs)] : Moderate (4-6 METs) [Smoker] : not a smoker [Chronic Anticoagulation] : no chronic anticoagulation [Chronic Kidney Disease] : no chronic kidney disease [Diabetes] : no diabetes [FreeTextEntry1] : C3-C7 fusion surgery [FreeTextEntry2] : 6/19/2023 [FreeTextEntry3] : Dr. Yo Sullivan [FreeTextEntry4] : Surgery will be at [facility]\par PST done at [facility] 6/7/2023. [FreeTextEntry7] : 11/17/2022 - echocardiogram - mild MR, mild aortic sclerosis without stenosis.  (Dr. Car Davis).\par Stress test normal. [FreeTextEntry8] : Limited by back pains, and not by breathing.

## 2023-06-09 NOTE — ASSESSMENT
[Other: _____] : [unfilled] [Patient Optimized for Surgery] : Patient optimized for surgery [No Further Testing Recommended] : no further testing recommended [As per surgery] : as per surgery

## 2023-06-09 NOTE — PHYSICAL EXAM
[No Acute Distress] : no acute distress [Well Nourished] : well nourished [Well Developed] : well developed [Well-Appearing] : well-appearing [Normal Voice/Communication] : normal voice/communication [Normal Sclera/Conjunctiva] : normal sclera/conjunctiva [PERRL] : pupils equal round and reactive to light [EOMI] : extraocular movements intact [Normal Outer Ear/Nose] : the outer ears and nose were normal in appearance [Normal Oropharynx] : the oropharynx was normal [Normal TMs] : both tympanic membranes were normal [No JVD] : no jugular venous distention [No Lymphadenopathy] : no lymphadenopathy [Supple] : supple [Thyroid Normal, No Nodules] : the thyroid was normal and there were no nodules present [No Respiratory Distress] : no respiratory distress  [No Accessory Muscle Use] : no accessory muscle use [Clear to Auscultation] : lungs were clear to auscultation bilaterally [Normal Rate] : normal rate  [Regular Rhythm] : with a regular rhythm [Normal S1, S2] : normal S1 and S2 [No Murmur] : no murmur heard [No Carotid Bruits] : no carotid bruits [No Abdominal Bruit] : a ~M bruit was not heard ~T in the abdomen [No Varicosities] : no varicosities [Pedal Pulses Present] : the pedal pulses are present [No Edema] : there was no peripheral edema [No Palpable Aorta] : no palpable aorta [No Extremity Clubbing/Cyanosis] : no extremity clubbing/cyanosis [Soft] : abdomen soft [Non Tender] : non-tender [Non-distended] : non-distended [No Masses] : no abdominal mass palpated [No HSM] : no HSM [Normal Bowel Sounds] : normal bowel sounds [No Hernias] : no hernias [Normal Posterior Cervical Nodes] : no posterior cervical lymphadenopathy [Normal Anterior Cervical Nodes] : no anterior cervical lymphadenopathy [No CVA Tenderness] : no CVA  tenderness [Scoliosis] : scoliosis [No Joint Swelling] : no joint swelling [Grossly Normal Strength/Tone] : grossly normal strength/tone [No Rash] : no rash [Coordination Grossly Intact] : coordination grossly intact [No Focal Deficits] : no focal deficits [Normal Gait] : normal gait [Deep Tendon Reflexes (DTR)] : deep tendon reflexes were 2+ and symmetric [Normal Affect] : the affect was normal [Normal Insight/Judgement] : insight and judgment were intact [de-identified] : +tenderness over cervical, throacic, and lumbar vertebrae with moderate palpation

## 2023-06-09 NOTE — RESULTS/DATA
[] : results reviewed [ECG Reviewed] : reviewed [Normal] : The 12 - lead ECG is normal [NSR] : normal sinus rhythm [Ventricular Rate___] : ventricular rate is [unfilled] beats per minute [P Waves Normal] : the P wave is normal [ECG Intervals NE.] : NE interval is normal [SD Interval___] : [unfilled] seconds [Normal QRS] : the QRS is normal [QRS Interval___] : QRS interval: [unfilled] seconds [Low Voltage] : low voltage [ECG Axis] : the QRS axis is normal [QTc Interval___] : QTc interval: [unfilled] [Normal ST Segments] : the ST segments are normal [ECG T. Waves] : normal [de-identified] : WBC 8.74, HgB 12.6,  [de-identified] : PT 10.9, INR 1.0, PTT 31.0 [de-identified] : WNL [de-identified] : U/A - 15 ketones, otherwise WNL

## 2023-06-27 ENCOUNTER — NON-APPOINTMENT (OUTPATIENT)
Age: 38
End: 2023-06-27

## 2023-09-15 ENCOUNTER — TRANSCRIPTION ENCOUNTER (OUTPATIENT)
Age: 38
End: 2023-09-15

## 2023-09-18 ENCOUNTER — TRANSCRIPTION ENCOUNTER (OUTPATIENT)
Age: 38
End: 2023-09-18

## 2023-09-20 ENCOUNTER — TRANSCRIPTION ENCOUNTER (OUTPATIENT)
Age: 38
End: 2023-09-20

## 2023-09-21 ENCOUNTER — TRANSCRIPTION ENCOUNTER (OUTPATIENT)
Age: 38
End: 2023-09-21

## 2023-09-22 ENCOUNTER — TRANSCRIPTION ENCOUNTER (OUTPATIENT)
Age: 38
End: 2023-09-22

## 2024-02-06 ENCOUNTER — NON-APPOINTMENT (OUTPATIENT)
Age: 39
End: 2024-02-06

## 2024-02-26 ENCOUNTER — NON-APPOINTMENT (OUTPATIENT)
Age: 39
End: 2024-02-26

## 2024-02-26 ENCOUNTER — APPOINTMENT (OUTPATIENT)
Dept: INTERNAL MEDICINE | Facility: CLINIC | Age: 39
End: 2024-02-26
Payer: COMMERCIAL

## 2024-02-26 VITALS
WEIGHT: 212 LBS | BODY MASS INDEX: 31.4 KG/M2 | OXYGEN SATURATION: 99 % | SYSTOLIC BLOOD PRESSURE: 130 MMHG | HEIGHT: 69 IN | HEART RATE: 70 BPM | DIASTOLIC BLOOD PRESSURE: 80 MMHG | TEMPERATURE: 98.6 F

## 2024-02-26 DIAGNOSIS — J45.990 EXERCISE INDUCED BRONCHOSPASM: ICD-10-CM

## 2024-02-26 DIAGNOSIS — F32.A ANXIETY DISORDER, UNSPECIFIED: ICD-10-CM

## 2024-02-26 DIAGNOSIS — F41.9 ANXIETY DISORDER, UNSPECIFIED: ICD-10-CM

## 2024-02-26 DIAGNOSIS — Z00.00 ENCOUNTER FOR GENERAL ADULT MEDICAL EXAMINATION W/OUT ABNORMAL FINDINGS: ICD-10-CM

## 2024-02-26 DIAGNOSIS — E28.2 POLYCYSTIC OVARIAN SYNDROME: ICD-10-CM

## 2024-02-26 PROCEDURE — 93000 ELECTROCARDIOGRAM COMPLETE: CPT

## 2024-02-26 PROCEDURE — 99396 PREV VISIT EST AGE 40-64: CPT | Mod: 25

## 2024-02-26 RX ORDER — CYCLOSPORINE 0.5 MG/ML
0.05 EMULSION OPHTHALMIC
Refills: 0 | Status: ACTIVE | COMMUNITY

## 2024-02-26 RX ORDER — DOXEPIN HYDROCHLORIDE 50 MG/1
50 CAPSULE ORAL DAILY
Refills: 0 | Status: COMPLETED | COMMUNITY
Start: 2023-03-06 | End: 2024-02-26

## 2024-02-26 RX ORDER — CLINDAMYCIN PHOSPHATE 1 G/10ML
1 GEL TOPICAL
Qty: 30 | Refills: 0 | Status: COMPLETED | COMMUNITY
Start: 2023-01-26 | End: 2024-02-26

## 2024-02-26 NOTE — HISTORY OF PRESENT ILLNESS
[de-identified] : Patient presents for a follow-up annual physical.  Patient is a 38-year-old female with a history of PCOS, GERD, depression/anxiety and herniated disc.  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  She had herniation of L4-L5 with nerve compression.  She went on medical leave and is on workers compensation.  She sees Dr. Justin Hooks (Neuro).  She had an epidural which did not help her symptoms.  Patient had a nerve ablation 11/2/2021 in the lumbar spine with Dr. Lomeli.  She had been on ibuprofen but couldn't tolerate it due to her GERD.  She is currently on prn tramadol and Lyrica.  She had C3-C7 fusion surgery with Dr. Sullivan at [place] in June 2023, which helped her pain syndrome modestly.  Patient had a normal thyroid US about 2014.  Her PCP at the time felt that the thyroid was enlarged.  She went to an Endocrinologist at the time as well and reports that there were no significant findings.  Patient had follow-up of microcysts followed by breast US.  She says she was advised by her Gyn to start regular breast cancer screening at age 38.  Patient had an injection at [facility] 10/25/2022, and then got COVID-19 afterwards.  She saw her pulmonologist (Dr. Hurst) and got inhalers.  She has recovered.  She also accidentally cut the 2nd digit of her L hand when cutting a piece of cheese at home.  She was seen at [facility] and did not need surgery or wound care other than a Band-aid.  Patient went to Dr. Justin Bardales (GI) early March 2023 for BRPBR.  EGD showed gastritis, and colonoscopy was normal.  She stopped taking PPIs daily and uses them when needed.  She had anemia, which subsequently resolved.  Patient was seeing psychiatry (Dr. Morris Mayer) for depression/anxiety, and her bupropion is 450mg.  She is no longer on doxepin 50mg (which was to help her sleep).  She will have to switch to anew psychiatrist due to her insurance change ([employer]).    She was started on spironolactone 50mg daily from her Gyn due to a high free testosterone level.  Patient previously lost weight and stopped her olmesartan.  The weight came back later, and she went back on the olmesartan due to headaches and rising BPs.  Patient declines the influenza vaccination this season.  She did not get the [id number] COVID-19 booster.  Tdap UTD from April 2022.

## 2024-02-26 NOTE — DATA REVIEWED
[FreeTextEntry1] : ECG - NSR @ 60, nl axis, intervals, no ST-T changes.  Low voltage.  Vitals - /80, T 98.6F, HR 70, Ht 5'9", Wt 212lbs, BMI 31.31 kg/m2, O2 sat 99%.

## 2024-02-26 NOTE — PHYSICAL EXAM
[No Acute Distress] : no acute distress [Well Nourished] : well nourished [Well Developed] : well developed [Well-Appearing] : well-appearing [Normal Voice/Communication] : normal voice/communication [Normal Sclera/Conjunctiva] : normal sclera/conjunctiva [PERRL] : pupils equal round and reactive to light [EOMI] : extraocular movements intact [Normal Outer Ear/Nose] : the outer ears and nose were normal in appearance [Normal Oropharynx] : the oropharynx was normal [Normal TMs] : both tympanic membranes were normal [No Lymphadenopathy] : no lymphadenopathy [No JVD] : no jugular venous distention [Supple] : supple [Thyroid Normal, No Nodules] : the thyroid was normal and there were no nodules present [No Respiratory Distress] : no respiratory distress  [No Accessory Muscle Use] : no accessory muscle use [Clear to Auscultation] : lungs were clear to auscultation bilaterally [Normal Rate] : normal rate  [Normal S1, S2] : normal S1 and S2 [Regular Rhythm] : with a regular rhythm [No Murmur] : no murmur heard [No Abdominal Bruit] : a ~M bruit was not heard ~T in the abdomen [No Carotid Bruits] : no carotid bruits [No Varicosities] : no varicosities [Pedal Pulses Present] : the pedal pulses are present [No Edema] : there was no peripheral edema [No Palpable Aorta] : no palpable aorta [No Extremity Clubbing/Cyanosis] : no extremity clubbing/cyanosis [Normal Appearance] : normal in appearance [No Nipple Discharge] : no nipple discharge [No Axillary Lymphadenopathy] : no axillary lymphadenopathy [Soft] : abdomen soft [Non Tender] : non-tender [Non-distended] : non-distended [No HSM] : no HSM [No Masses] : no abdominal mass palpated [Normal Bowel Sounds] : normal bowel sounds [No Hernias] : no hernias [No Spinal Tenderness] : no spinal tenderness [No CVA Tenderness] : no CVA  tenderness [No Joint Swelling] : no joint swelling [Grossly Normal Strength/Tone] : grossly normal strength/tone [No Rash] : no rash [Coordination Grossly Intact] : coordination grossly intact [No Focal Deficits] : no focal deficits [Normal Gait] : normal gait [Normal Affect] : the affect was normal [Deep Tendon Reflexes (DTR)] : deep tendon reflexes were 2+ and symmetric [Normal Insight/Judgement] : insight and judgment were intact

## 2024-02-26 NOTE — ASSESSMENT
[FreeTextEntry1] : (1) HCM - discussed diet, exercise, weight maintenance.  Labs ordered in office as below, including HIV screening.  Hepatitis C screen was negative March 2021.  Patient declines the influenza vaccination this season.  She did not get the [id number] COVID-19 booster.  Tdap UTD from 4/23/2022.  She has had standard childhood vaccines and also the Hepatitis B series.  She will have records forwarded from her prior PCP.  Follow-up with Gyn as directed.  Continue breast imaging every 1-2 years.  Screening colonoscopy March 2023 with Dr. Justin Bardales was normal, with 5-year follow-up recommended.  Prior colonoscopy 7/2021 with Dr. Carballo showed 1 hyperplastic polyp.  Health Care Proxy on chart.  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  She sees Dr. Justin Hooks who is prescribing Lyrica and cyclobenzaprine.  Patient avoiding NSAIDs due to GERD.  She sees ortho and had surgery in 2023.  (3) CV - patient now back on the olmesartan.  She will follow-up with me periodically.  (4) GI - patient with gastritis on EGD, and currently managing with diet control.  She is not on the PPI regularly.  Follow-up with Dr. Bardales as directed.  (5) Psych - patient was seeing Dr. Mayer (psychiatry) for management of anxiety/depression.  She is on bupropion ER.  She says she will need to get a new psychiatrist due to her insurance change.

## 2024-02-26 NOTE — HEALTH RISK ASSESSMENT
[Monthly or less (1 pt)] : Monthly or less (1 point) [No] : In the past 12 months have you used drugs other than those required for medical reasons? No [No falls in past year] : Patient reported no falls in the past year [2] : 2) Feeling down, depressed, or hopeless for more than half of the days (2) [PHQ-2 Positive] : PHQ-2 Positive [Nearly Every Day (3)] : 4.) Feeling tired or having little energy? Nearly every day [Several Days (1)] : 6.) Feeling bad about yourself, or that you are a failure, or have let yourself or your family down? Several days [Moderately Severe] : Severity of Depression is Moderately Severe [1/2 of Days or More (2)] : 8.) Moving or speaking so slowly that other people could have noticed, or the opposite, moving or speaking faster than usual? Half the days or more [Somewhat Difficult] : How difficult have these problems made it for you to do your work, take care of things at home, or get along with people? Somewhat difficult [I have developed a follow-up plan documented below in the note.] : I have developed a follow-up plan documented below in the note. [Patient reported mammogram was abnormal] : Patient reported mammogram was abnormal [Patient reported PAP Smear was normal] : Patient reported PAP Smear was normal [Patient reported colonoscopy was abnormal] : Patient reported colonoscopy was abnormal [Alone] : lives alone [On disability] : on disability [Single] : single [Feels Safe at Home] : Feels safe at home [Fully functional (bathing, dressing, toileting, transferring, walking, feeding)] : Fully functional (bathing, dressing, toileting, transferring, walking, feeding) [Fully functional (using the telephone, shopping, preparing meals, housekeeping, doing laundry, using] : Fully functional and needs no help or supervision to perform IADLs (using the telephone, shopping, preparing meals, housekeeping, doing laundry, using transportation, managing medications and managing finances) [Smoke Detector] : smoke detector [Carbon Monoxide Detector] : carbon monoxide detector [Seat Belt] :  uses seat belt [Sunscreen] : uses sunscreen [With Patient/Caregiver] : , with patient/caregiver [Designated Healthcare Proxy] : Designated healthcare proxy [Name: ___] : Health Care Proxy's Name: [unfilled]  [Relationship: ___] : Relationship: [unfilled] [de-identified] : Limited by back pain / herniated disc..   Some treadmill - 15 to 40min, [place] and weights. [[id number]] : 4 [[id number]] : 17 [Language] : denies difficulty with language [Change in mental status noted] : No change in mental status noted [Behavior] : denies difficulty with behavior [Handling Complex Tasks] : denies difficulty handling complex tasks [Sexually Active] : not sexually active [Reasoning] : denies difficulty with reasoning [Reports changes in hearing] : Reports no changes in hearing [Reports changes in vision] : Reports no changes in vision [Reports changes in dental health] : Reports no changes in dental health [MammogramDate] : 02/24 [MammogramComments] : BIRADS-2 [PapSmearComments] : Dr. Nneka James [PapSmearDate] : 2/24 [ColonoscopyDate] : 03/23 [ColonoscopyComments] : Mar 2023 - Dr. Justin Bardales - no polyps.  5-year follow-up.  7/1/2021 - Dr. Carballo, 1 hyperplastic polyp.  3 year follow-up. [HIVDate] : 03/23 [HepatitisCComments] : negative [HepatitisCDate] : 03/21 [de-identified] : No vision correction.  Dr. Kain Ford.  Seen in 2024. 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 [de-identified] : Going regularly. [AdvancecareDate] : 02/26/2024 [Never] : Never [FreeTextEntry4] : Health Care Proxy on chart.

## 2024-02-27 ENCOUNTER — TRANSCRIPTION ENCOUNTER (OUTPATIENT)
Age: 39
End: 2024-02-27

## 2024-02-27 RX ORDER — BUPROPION HYDROCHLORIDE 150 MG/1
150 TABLET, EXTENDED RELEASE ORAL
Qty: 270 | Refills: 0 | Status: ACTIVE | COMMUNITY
Start: 2022-08-10 | End: 1900-01-01

## 2024-02-28 ENCOUNTER — TRANSCRIPTION ENCOUNTER (OUTPATIENT)
Age: 39
End: 2024-02-28

## 2024-03-05 ENCOUNTER — TRANSCRIPTION ENCOUNTER (OUTPATIENT)
Age: 39
End: 2024-03-05

## 2024-03-18 ENCOUNTER — TRANSCRIPTION ENCOUNTER (OUTPATIENT)
Age: 39
End: 2024-03-18

## 2024-03-18 DIAGNOSIS — R79.89 OTHER SPECIFIED ABNORMAL FINDINGS OF BLOOD CHEMISTRY: ICD-10-CM

## 2024-03-18 LAB
25(OH)D3 SERPL-MCNC: 37.8 NG/ML
ALBUMIN SERPL ELPH-MCNC: 4.4 G/DL
ALP BLD-CCNC: 78 U/L
ALT SERPL-CCNC: 14 U/L
ANION GAP SERPL CALC-SCNC: 9 MMOL/L
AST SERPL-CCNC: 15 U/L
BASOPHILS # BLD AUTO: 0.07 K/UL
BASOPHILS NFR BLD AUTO: 1.1 %
BILIRUB SERPL-MCNC: 0.3 MG/DL
BUN SERPL-MCNC: 9 MG/DL
CALCIUM SERPL-MCNC: 9.2 MG/DL
CHLORIDE SERPL-SCNC: 105 MMOL/L
CHOLEST SERPL-MCNC: 153 MG/DL
CO2 SERPL-SCNC: 26 MMOL/L
CREAT SERPL-MCNC: 0.66 MG/DL
EGFR: 114 ML/MIN/1.73M2
EOSINOPHIL # BLD AUTO: 0 K/UL
EOSINOPHIL NFR BLD AUTO: 0 %
ESTIMATED AVERAGE GLUCOSE: 103 MG/DL
GLUCOSE SERPL-MCNC: 89 MG/DL
HBA1C MFR BLD HPLC: 5.2 %
HCT VFR BLD CALC: 35.4 %
HDLC SERPL-MCNC: 69 MG/DL
HGB BLD-MCNC: 11.3 G/DL
IMM GRANULOCYTES NFR BLD AUTO: 0.3 %
LDLC SERPL CALC-MCNC: 70 MG/DL
LYMPHOCYTES # BLD AUTO: 2.06 K/UL
LYMPHOCYTES NFR BLD AUTO: 32.9 %
MAN DIFF?: NORMAL
MCHC RBC-ENTMCNC: 29.7 PG
MCHC RBC-ENTMCNC: 31.9 GM/DL
MCV RBC AUTO: 92.9 FL
MONOCYTES # BLD AUTO: 0.58 K/UL
MONOCYTES NFR BLD AUTO: 9.3 %
NEUTROPHILS # BLD AUTO: 3.53 K/UL
NEUTROPHILS NFR BLD AUTO: 56.4 %
NONHDLC SERPL-MCNC: 84 MG/DL
PLATELET # BLD AUTO: 211 K/UL
POTASSIUM SERPL-SCNC: 4.6 MMOL/L
PROT SERPL-MCNC: 6.8 G/DL
RBC # BLD: 3.81 M/UL
RBC # FLD: 14.1 %
SODIUM SERPL-SCNC: 140 MMOL/L
T4 FREE SERPL-MCNC: 0.8 NG/DL
TRIGL SERPL-MCNC: 72 MG/DL
TSH SERPL-ACNC: 1.39 UIU/ML
VIT B12 SERPL-MCNC: 468 PG/ML
WBC # FLD AUTO: 6.26 K/UL

## 2024-03-18 RX ORDER — OLMESARTAN MEDOXOMIL 40 MG/1
40 TABLET, FILM COATED ORAL
Qty: 90 | Refills: 3 | Status: ACTIVE | COMMUNITY
Start: 2022-01-31 | End: 1900-01-01

## 2024-03-19 ENCOUNTER — TRANSCRIPTION ENCOUNTER (OUTPATIENT)
Age: 39
End: 2024-03-19

## 2024-03-21 ENCOUNTER — LABORATORY RESULT (OUTPATIENT)
Age: 39
End: 2024-03-21

## 2024-04-01 ENCOUNTER — TRANSCRIPTION ENCOUNTER (OUTPATIENT)
Age: 39
End: 2024-04-01

## 2024-04-02 ENCOUNTER — TRANSCRIPTION ENCOUNTER (OUTPATIENT)
Age: 39
End: 2024-04-02

## 2024-04-08 ENCOUNTER — TRANSCRIPTION ENCOUNTER (OUTPATIENT)
Age: 39
End: 2024-04-08

## 2024-05-06 ENCOUNTER — TRANSCRIPTION ENCOUNTER (OUTPATIENT)
Age: 39
End: 2024-05-06

## 2024-05-28 ENCOUNTER — APPOINTMENT (OUTPATIENT)
Dept: INTERNAL MEDICINE | Facility: CLINIC | Age: 39
End: 2024-05-28
Payer: MEDICAID

## 2024-05-28 VITALS
OXYGEN SATURATION: 97 % | HEIGHT: 69 IN | WEIGHT: 208 LBS | SYSTOLIC BLOOD PRESSURE: 140 MMHG | DIASTOLIC BLOOD PRESSURE: 98 MMHG | BODY MASS INDEX: 30.81 KG/M2 | HEART RATE: 71 BPM | TEMPERATURE: 98.7 F

## 2024-05-28 VITALS — DIASTOLIC BLOOD PRESSURE: 95 MMHG | SYSTOLIC BLOOD PRESSURE: 140 MMHG

## 2024-05-28 VITALS — SYSTOLIC BLOOD PRESSURE: 140 MMHG | DIASTOLIC BLOOD PRESSURE: 92 MMHG

## 2024-05-28 DIAGNOSIS — I10 ESSENTIAL (PRIMARY) HYPERTENSION: ICD-10-CM

## 2024-05-28 PROCEDURE — G2211 COMPLEX E/M VISIT ADD ON: CPT | Mod: NC,1L

## 2024-05-28 PROCEDURE — 99213 OFFICE O/P EST LOW 20 MIN: CPT

## 2024-05-28 RX ORDER — TRAMADOL HYDROCHLORIDE 50 MG/1
50 TABLET, COATED ORAL EVERY 6 HOURS
Refills: 0 | Status: COMPLETED | COMMUNITY
End: 2024-05-28

## 2024-05-28 RX ORDER — OXYCODONE 5 MG/1
5 TABLET ORAL
Refills: 0 | Status: ACTIVE | COMMUNITY

## 2024-05-28 RX ORDER — FLUTICASONE PROPIONATE AND SALMETEROL 50; 250 UG/1; UG/1
250-50 POWDER RESPIRATORY (INHALATION)
Qty: 1 | Refills: 1 | Status: ACTIVE | COMMUNITY

## 2024-05-28 RX ORDER — SPIRONOLACTONE 50 MG/1
50 TABLET ORAL DAILY
Refills: 0 | Status: DISCONTINUED | COMMUNITY
End: 2024-05-28

## 2024-05-28 NOTE — ASSESSMENT
[FreeTextEntry1] : Patient with elevated BP today.  She has had worsening pain control, which is likely contributing to this.  The spironolactone (which was for PCOS) was discontinued, and this may have had an effect as well.  She notes that there are times at home when the SBP is closer to 100mm Hg.  Will allow patient to remain on the same regimen, and she can continue to monitor her BP.  She should follow-up with me in 2 months.

## 2024-05-28 NOTE — HISTORY OF PRESENT ILLNESS
[de-identified] : Patient for follow-up of HTN.  She notes that her pain control has been worse recently.  Her Pain MD (Dr. Marito Rolon) recently switched her from tramadol to oxycodone.  She remains on Olmesartan 40mg daily.  She notes that at times the SBP is in the 100mm Hg range.  She also notes that she was taken off of spironolactone for her PCOS.  Her psychiatrist (Dr. Baeza) is weaning her off Lamictal.  She is seeing Dr. Hrust (Pulmonary) and was recently given Advair.

## 2024-07-30 ENCOUNTER — APPOINTMENT (OUTPATIENT)
Dept: INTERNAL MEDICINE | Facility: CLINIC | Age: 39
End: 2024-07-30
Payer: MEDICARE

## 2024-07-30 VITALS — DIASTOLIC BLOOD PRESSURE: 82 MMHG | SYSTOLIC BLOOD PRESSURE: 128 MMHG

## 2024-07-30 VITALS
OXYGEN SATURATION: 98 % | BODY MASS INDEX: 29.24 KG/M2 | RESPIRATION RATE: 15 BRPM | WEIGHT: 198 LBS | DIASTOLIC BLOOD PRESSURE: 81 MMHG | HEART RATE: 71 BPM | TEMPERATURE: 98.2 F | SYSTOLIC BLOOD PRESSURE: 129 MMHG

## 2024-07-30 DIAGNOSIS — I10 ESSENTIAL (PRIMARY) HYPERTENSION: ICD-10-CM

## 2024-07-30 DIAGNOSIS — H04.129 DRY EYE SYNDROME OF UNSPECIFIED LACRIMAL GLAND: ICD-10-CM

## 2024-07-30 PROCEDURE — G2211 COMPLEX E/M VISIT ADD ON: CPT

## 2024-07-30 PROCEDURE — 99203 OFFICE O/P NEW LOW 30 MIN: CPT

## 2024-07-30 RX ORDER — OXYCODONE AND ACETAMINOPHEN 5; 325 MG/1; MG/1
5-325 TABLET ORAL
Qty: 240 | Refills: 0 | Status: ACTIVE | COMMUNITY

## 2024-07-30 RX ORDER — DEXLANSOPRAZOLE 30 MG/1
30 CAPSULE, DELAYED RELEASE ORAL DAILY
Refills: 0 | Status: ACTIVE | COMMUNITY

## 2024-07-30 RX ORDER — TIZANIDINE 4 MG/1
4 TABLET ORAL 3 TIMES DAILY
Refills: 0 | Status: ACTIVE | COMMUNITY

## 2024-07-30 NOTE — HISTORY OF PRESENT ILLNESS
[de-identified] : Patient for BP follow-up.  Patient has been on the olmesartan 40mg daily and is tolerating it well.  No dizziness or lightheadedness.  She has headaches which she feels are more attributable to her neck spasms.  Patient had EGD/Colonogscopy with Dr. Nam Dominguez in 2024.

## 2024-07-30 NOTE — ASSESSMENT
[FreeTextEntry1] : Patient's BP adequately controlled on olmesartan 40mg daily.  She can continue the present regimen.  She can check the BP at home periodically (preferably with a manual cuff).  She will continue with her pain MD.  She can see me for her annual physical Feb 2025 and can call for an earlier appointment for a BP check if needed.  Medication list updated.

## 2024-08-02 ENCOUNTER — TRANSCRIPTION ENCOUNTER (OUTPATIENT)
Age: 39
End: 2024-08-02

## 2024-09-27 ENCOUNTER — APPOINTMENT (OUTPATIENT)
Dept: INTERNAL MEDICINE | Facility: CLINIC | Age: 39
End: 2024-09-27
Payer: MEDICARE

## 2024-09-27 VITALS
HEART RATE: 74 BPM | WEIGHT: 181 LBS | BODY MASS INDEX: 26.81 KG/M2 | DIASTOLIC BLOOD PRESSURE: 84 MMHG | OXYGEN SATURATION: 99 % | SYSTOLIC BLOOD PRESSURE: 130 MMHG | HEIGHT: 69 IN

## 2024-09-27 VITALS — DIASTOLIC BLOOD PRESSURE: 62 MMHG | SYSTOLIC BLOOD PRESSURE: 108 MMHG

## 2024-09-27 DIAGNOSIS — I10 ESSENTIAL (PRIMARY) HYPERTENSION: ICD-10-CM

## 2024-09-27 PROCEDURE — 99214 OFFICE O/P EST MOD 30 MIN: CPT

## 2024-09-27 PROCEDURE — G2211 COMPLEX E/M VISIT ADD ON: CPT

## 2024-09-27 RX ORDER — LIFITEGRAST 50 MG/ML
5 SOLUTION/ DROPS OPHTHALMIC
Refills: 0 | Status: ACTIVE | COMMUNITY

## 2024-09-27 RX ORDER — OMEPRAZOLE MAGNESIUM 20 MG/1
20 CAPSULE, DELAYED RELEASE ORAL
Refills: 0 | Status: ACTIVE | COMMUNITY

## 2024-09-27 NOTE — HISTORY OF PRESENT ILLNESS
[de-identified] : In August, patient had an episode of passing out in the bathroom.  Patient had a BP 82/46   Then it was 86/54.  She lowered the olmesartan to 20mg.  Later she had readings ranging from 70s to 110s over 60s.  She most recently is on 20mg.  She has lost weight with diet improvement.  No chest pain, dyspnea.  She notes that she is now on Ambien 10mg daily from her psychiatrist.

## 2024-09-27 NOTE — ASSESSMENT
[FreeTextEntry1] : Patient has had considerable weight loss (intentionally), and she has had low BPs.  Will lower her olmesartan to 10mg daily (she had lowered it to 20mg and still is running low).  Patient advised that there are no 10mg tablets, and she can take 5mg x 2 tablets for now.  She will continue to monitor her BP, and follow-up with me in 1 month.  Patient defers influenza vaccination today.

## 2024-10-21 ENCOUNTER — APPOINTMENT (OUTPATIENT)
Dept: INTERNAL MEDICINE | Facility: CLINIC | Age: 39
End: 2024-10-21
Payer: MEDICAID

## 2024-10-21 VITALS — SYSTOLIC BLOOD PRESSURE: 88 MMHG | DIASTOLIC BLOOD PRESSURE: 60 MMHG

## 2024-10-21 VITALS
HEIGHT: 69 IN | HEART RATE: 71 BPM | SYSTOLIC BLOOD PRESSURE: 130 MMHG | OXYGEN SATURATION: 99 % | DIASTOLIC BLOOD PRESSURE: 65 MMHG | WEIGHT: 182 LBS | BODY MASS INDEX: 26.96 KG/M2

## 2024-10-21 DIAGNOSIS — I10 ESSENTIAL (PRIMARY) HYPERTENSION: ICD-10-CM

## 2024-10-21 PROCEDURE — G2211 COMPLEX E/M VISIT ADD ON: CPT | Mod: NC

## 2024-10-21 PROCEDURE — 99214 OFFICE O/P EST MOD 30 MIN: CPT

## 2024-11-07 NOTE — ED ADULT TRIAGE NOTE - ACCOMPANIED BY
Wear supportive underwear. If any sudden or severe pain, fevers, new or worsening symptoms return to the ER. Please follow-up with the urologist as we discussed.   
Non family member

## 2025-02-22 ENCOUNTER — EMERGENCY (EMERGENCY)
Facility: HOSPITAL | Age: 40
LOS: 1 days | Discharge: HOME | End: 2025-02-22
Attending: STUDENT IN AN ORGANIZED HEALTH CARE EDUCATION/TRAINING PROGRAM
Payer: MEDICARE

## 2025-02-22 VITALS
TEMPERATURE: 99 F | SYSTOLIC BLOOD PRESSURE: 185 MMHG | HEIGHT: 67 IN | HEART RATE: 83 BPM | WEIGHT: 166.89 LBS | RESPIRATION RATE: 20 BRPM | DIASTOLIC BLOOD PRESSURE: 108 MMHG | OXYGEN SATURATION: 98 %

## 2025-02-22 VITALS
HEART RATE: 63 BPM | SYSTOLIC BLOOD PRESSURE: 168 MMHG | OXYGEN SATURATION: 98 % | RESPIRATION RATE: 18 BRPM | DIASTOLIC BLOOD PRESSURE: 94 MMHG

## 2025-02-22 PROCEDURE — 93971 EXTREMITY STUDY: CPT

## 2025-02-22 PROCEDURE — 93971 EXTREMITY STUDY: CPT | Mod: 26,RT

## 2025-02-22 PROCEDURE — 99284 EMERGENCY DEPT VISIT MOD MDM: CPT | Mod: 25

## 2025-02-22 PROCEDURE — 99284 EMERGENCY DEPT VISIT MOD MDM: CPT | Mod: GC

## 2025-02-22 RX ORDER — CEPHALEXIN 500 MG
1 CAPSULE ORAL
Qty: 14 | Refills: 0
Start: 2025-02-22 | End: 2025-02-28

## 2025-02-22 RX ORDER — FLUCONAZOLE 100 MG/1
1 TABLET ORAL
Qty: 5 | Refills: 0
Start: 2025-02-22 | End: 2025-02-26

## 2025-02-22 NOTE — ED ADULT TRIAGE NOTE - PAIN: PRESENCE, MLM
RECORDS RECEIVED FROM: Internal   DATE RECEIVED: 02.16.2022   NOTES (Gather within 2 years) STATUS DETAILS   OFFICE NOTE from referring provider   Internal 01.21.2022 Lino Levi MD   OFFICE NOTE from other specialist Care Everywhere 02.02.2022 Molly Diaz M.D   DISCHARGE SUMMARY from hospital N/A    DISCHARGE REPORT from the ER N/A    LABS (any labs) Internal    MEDICATION LIST Internal    IMAGING  (NEED IMAGES AND REPORTS)     Osteomyelitis: Foot imaging  N/A    Liver Abscess: Abdominal imaging N/A    Other (anything related to diagnoses Internal 01.28.2022 CT CHEST ABDOMEN PELVIS W/O        
complains of pain/discomfort

## 2025-02-22 NOTE — ED PROVIDER NOTE - OBJECTIVE STATEMENT
39-year-old female history of neck surgery C3-C7 in 2022 presents to the ED for evaluation of right arm swelling X 1 week.  Patient states she woke up last Sunday with swelling to her right arm from her shoulder all the way to her fingertips.  She Ace wrapped the arm and has been applying ice intermittently and the swelling has gone down.  She went to urgent care for assessment, was told to present to the emergency room for possible x-rays and ultrasound.  She has chronic numbness and tingling in both upper extremities after the surgery however did not worsening of the symptoms recently.  Denies any headache, vision changes, dyspnea on exertion, shortness of breath, leg swelling or any other concerning symptoms at this time.  No recent long distance travels, no history of DVT/PE, not on any hormones.

## 2025-02-22 NOTE — ED PROVIDER NOTE - NSFOLLOWUPINSTRUCTIONS_ED_ALL_ED_FT
You were evaluated in the Emergency Department for right arm swelling.  The ultrasound was negative for a blood clot.  Due to the localization of the swelling and redness closer to your hand, we are treating you for an infection of your hand.    Please take the Keflex twice a day for 7 days.     Return to the ED for any new or concerning symptoms.

## 2025-02-22 NOTE — ED PROVIDER NOTE - PATIENT PORTAL LINK FT
You can access the FollowMyHealth Patient Portal offered by [facility] by registering at the following website: [email or web address]. By joining [facility]’s FollowMyHealth portal, you will also be able to view your health information using other applications (apps) compatible with our system.

## 2025-02-22 NOTE — ED PROVIDER NOTE - MUSCULOSKELETAL MINIMAL EXAM
+ No tenderness to palpation on right upper extremity or left upper extremity.  Right upper extremity with mild swelling compared to left, neurovascularly intact.  No signs of infection.

## 2025-02-22 NOTE — ED ADULT NURSE NOTE - OBJECTIVE STATEMENT
40 y/o female presenting to ED for right hand + right arm swelling x 6 days, pt denies trauma, bites, or wounds to area. Pt states that she woke up on sunday with swelling starting from her right shoulder to her fingers. Pt placed ACE wrap at home and intermittently applied ice which improved the swelling slightly. Pt went to urgent care where she was told that they did not have an xray machine so she was referred to ED. pt has chronic numbness + tingling to right arm after neck surgery in 2022, reports that these sx did not worsen with the swelling but is having pain to arm. On exam pt aox4 breathing even unlabored spontaneously, right arm visibly bigger than left arm, +radial pulses, cap refill <2 seconds, right arm is tender to touch

## 2025-02-22 NOTE — ED PROVIDER NOTE - CLINICAL SUMMARY MEDICAL DECISION MAKING FREE TEXT BOX
39-year-old female history of neck surgery C3-C7 in 2022 presents to the ED for evaluation of right arm swelling X 1 week.  Patient states she woke up last Sunday with swelling to her right arm from her shoulder all the way to her fingertips.  She Ace wrapped the arm and has been applying ice intermittently and the swelling has gone down. Patient has mild diffuse swelling of right arm.  Neurovascularly intact.  5 out of 5 muscle strength in upper and lower extremities.  No signs of infection, vital signs are stable.  No clinical risk factors for DVT/PE however we will get a ultrasound of the right upper extremity to rule out DVT and reassess.

## 2025-02-22 NOTE — ED PROVIDER NOTE - ATTENDING CONTRIBUTION TO CARE
39-year-old female history of neck surgery C3-C7 in 2022 presents to the ED for evaluation of right arm swelling X 1 week.  Patient states she woke up last Sunday with swelling to her right arm from her shoulder all the way to her fingertips. She has chronic numbness and tingling in both upper extremities after the surgery however did not worsening of the symptoms recently.  Denies any headache, vision changes, dyspnea on exertion, shortness of breath, leg swelling or any other concerning symptoms at this time.  No recent long distance travels, no history of DVT/PE, not on any hormones.    Physical Exam:  Gen: NAD, awake and alert, non-toxic appearing  HEENT: Atraumatic, oropharynx clear, moist mucous membranes, normal conjunctiva  Cardio: RRR, no murmurs, rubs or gallops  Lung: CTAB, no respiratory distress, no wheezes/rhonchi/rales B/L   MSK: no visible deformities, ROMx4   RUE: no focal erythema or warmth. mild swelling noted on entire RUE compared to LUE.  Neuro: No focal sensory or motor deficits  Skin: Warm, well perfused, no rash, no leg swelling     pt presents with RUE swelling. considering DVT vs cellulitis  US, reassess

## 2025-07-09 NOTE — PLAN
Outreach attempt was made to schedule a Medicare Wellness Visit. This was the second attempt. Contact was made, MW[facility] appointment scheduled.    [FreeTextEntry1] : Patient reminded to avoid NSAIDs for 7 days prior to surgery.  She can use Tylenol as needed for pain.  Patient was also reminded to call for any acute illness that may occur preoperatively, including fevers, chills, cough, phlegm production, sore throat, nausea, vomiting, abdominal pain, diarrhea, rectal bleeding, dysuria, hematuria, frequent urination, rash, body aches, or other concerning symptoms.